# Patient Record
Sex: FEMALE | Race: WHITE | NOT HISPANIC OR LATINO | Employment: FULL TIME | URBAN - METROPOLITAN AREA
[De-identification: names, ages, dates, MRNs, and addresses within clinical notes are randomized per-mention and may not be internally consistent; named-entity substitution may affect disease eponyms.]

---

## 2017-02-28 ENCOUNTER — APPOINTMENT (OUTPATIENT)
Dept: WOMENS IMAGING | Facility: HOSPITAL | Age: 42
End: 2017-02-28

## 2017-02-28 PROCEDURE — 77067 SCR MAMMO BI INCL CAD: CPT | Performed by: RADIOLOGY

## 2018-03-06 ENCOUNTER — APPOINTMENT (OUTPATIENT)
Dept: WOMENS IMAGING | Facility: HOSPITAL | Age: 43
End: 2018-03-06

## 2018-03-06 PROCEDURE — 77067 SCR MAMMO BI INCL CAD: CPT | Performed by: RADIOLOGY

## 2018-04-17 ENCOUNTER — HOSPITAL ENCOUNTER (EMERGENCY)
Facility: HOSPITAL | Age: 43
Discharge: HOME | End: 2018-04-17
Attending: EMERGENCY MEDICINE | Admitting: EMERGENCY MEDICINE

## 2018-04-17 ENCOUNTER — APPOINTMENT (OUTPATIENT)
Dept: GENERAL RADIOLOGY | Facility: HOSPITAL | Age: 43
End: 2018-04-17

## 2018-04-17 VITALS
TEMPERATURE: 98.3 F | OXYGEN SATURATION: 98 % | HEART RATE: 68 BPM | BODY MASS INDEX: 32.8 KG/M2 | RESPIRATION RATE: 16 BRPM | HEIGHT: 67 IN | WEIGHT: 209 LBS | DIASTOLIC BLOOD PRESSURE: 72 MMHG | SYSTOLIC BLOOD PRESSURE: 108 MMHG

## 2018-04-17 DIAGNOSIS — R05.9 COUGH: Primary | ICD-10-CM

## 2018-04-17 LAB
ALBUMIN SERPL-MCNC: 4.5 G/DL (ref 3.5–5.2)
ALBUMIN/GLOB SERPL: 1.7 G/DL
ALP SERPL-CCNC: 46 U/L (ref 39–117)
ALT SERPL W P-5'-P-CCNC: 24 U/L (ref 1–33)
ANION GAP SERPL CALCULATED.3IONS-SCNC: 10.7 MMOL/L
AST SERPL-CCNC: 15 U/L (ref 1–32)
BASOPHILS # BLD AUTO: 0.03 10*3/MM3 (ref 0–0.2)
BASOPHILS NFR BLD AUTO: 0.4 % (ref 0–1.5)
BILIRUB SERPL-MCNC: 0.6 MG/DL (ref 0.1–1.2)
BUN BLD-MCNC: 11 MG/DL (ref 6–20)
BUN/CREAT SERPL: 12.6 (ref 7–25)
CALCIUM SPEC-SCNC: 9.4 MG/DL (ref 8.6–10.5)
CHLORIDE SERPL-SCNC: 102 MMOL/L (ref 98–107)
CO2 SERPL-SCNC: 28.3 MMOL/L (ref 22–29)
CREAT BLD-MCNC: 0.87 MG/DL (ref 0.57–1)
D DIMER PPP FEU-MCNC: 0.45 MCGFEU/ML (ref 0–0.49)
DEPRECATED RDW RBC AUTO: 43.6 FL (ref 37–54)
EOSINOPHIL # BLD AUTO: 0.3 10*3/MM3 (ref 0–0.7)
EOSINOPHIL NFR BLD AUTO: 3.6 % (ref 0.3–6.2)
ERYTHROCYTE [DISTWIDTH] IN BLOOD BY AUTOMATED COUNT: 13.3 % (ref 11.7–13)
GFR SERPL CREATININE-BSD FRML MDRD: 71 ML/MIN/1.73
GLOBULIN UR ELPH-MCNC: 2.7 GM/DL
GLUCOSE BLD-MCNC: 91 MG/DL (ref 65–99)
HCG SERPL QL: NEGATIVE
HCT VFR BLD AUTO: 43.3 % (ref 35.6–45.5)
HGB BLD-MCNC: 14.6 G/DL (ref 11.9–15.5)
HOLD SPECIMEN: NORMAL
IMM GRANULOCYTES # BLD: 0 10*3/MM3 (ref 0–0.03)
IMM GRANULOCYTES NFR BLD: 0 % (ref 0–0.5)
LYMPHOCYTES # BLD AUTO: 2.26 10*3/MM3 (ref 0.9–4.8)
LYMPHOCYTES NFR BLD AUTO: 27.3 % (ref 19.6–45.3)
MCH RBC QN AUTO: 30.7 PG (ref 26.9–32)
MCHC RBC AUTO-ENTMCNC: 33.7 G/DL (ref 32.4–36.3)
MCV RBC AUTO: 91 FL (ref 80.5–98.2)
MONOCYTES # BLD AUTO: 0.46 10*3/MM3 (ref 0.2–1.2)
MONOCYTES NFR BLD AUTO: 5.6 % (ref 5–12)
NEUTROPHILS # BLD AUTO: 5.23 10*3/MM3 (ref 1.9–8.1)
NEUTROPHILS NFR BLD AUTO: 63.1 % (ref 42.7–76)
NT-PROBNP SERPL-MCNC: 38 PG/ML (ref 0–450)
PLATELET # BLD AUTO: 256 10*3/MM3 (ref 140–500)
PMV BLD AUTO: 8.9 FL (ref 6–12)
POTASSIUM BLD-SCNC: 4.1 MMOL/L (ref 3.5–5.2)
PROT SERPL-MCNC: 7.2 G/DL (ref 6–8.5)
RBC # BLD AUTO: 4.76 10*6/MM3 (ref 3.9–5.2)
SODIUM BLD-SCNC: 141 MMOL/L (ref 136–145)
TROPONIN T SERPL-MCNC: <0.01 NG/ML (ref 0–0.03)
WBC NRBC COR # BLD: 8.28 10*3/MM3 (ref 4.5–10.7)
WHOLE BLOOD HOLD SPECIMEN: NORMAL
WHOLE BLOOD HOLD SPECIMEN: NORMAL

## 2018-04-17 PROCEDURE — 93005 ELECTROCARDIOGRAM TRACING: CPT

## 2018-04-17 PROCEDURE — 99284 EMERGENCY DEPT VISIT MOD MDM: CPT

## 2018-04-17 PROCEDURE — 84484 ASSAY OF TROPONIN QUANT: CPT

## 2018-04-17 PROCEDURE — 84703 CHORIONIC GONADOTROPIN ASSAY: CPT

## 2018-04-17 PROCEDURE — 80053 COMPREHEN METABOLIC PANEL: CPT

## 2018-04-17 PROCEDURE — 36415 COLL VENOUS BLD VENIPUNCTURE: CPT

## 2018-04-17 PROCEDURE — 93005 ELECTROCARDIOGRAM TRACING: CPT | Performed by: EMERGENCY MEDICINE

## 2018-04-17 PROCEDURE — 85025 COMPLETE CBC W/AUTO DIFF WBC: CPT

## 2018-04-17 PROCEDURE — 83880 ASSAY OF NATRIURETIC PEPTIDE: CPT

## 2018-04-17 PROCEDURE — 93010 ELECTROCARDIOGRAM REPORT: CPT | Performed by: INTERNAL MEDICINE

## 2018-04-17 PROCEDURE — 85379 FIBRIN DEGRADATION QUANT: CPT | Performed by: NURSE PRACTITIONER

## 2018-04-17 RX ORDER — METHYLPREDNISOLONE 4 MG/1
TABLET ORAL
Qty: 21 TABLET | Refills: 0 | Status: SHIPPED | OUTPATIENT
Start: 2018-04-17 | End: 2018-08-27

## 2018-04-17 RX ORDER — AZITHROMYCIN 250 MG/1
250 TABLET, FILM COATED ORAL DAILY
Qty: 6 TABLET | Refills: 0 | Status: SHIPPED | OUTPATIENT
Start: 2018-04-17 | End: 2018-08-27

## 2018-04-17 RX ORDER — SODIUM CHLORIDE 0.9 % (FLUSH) 0.9 %
10 SYRINGE (ML) INJECTION AS NEEDED
Status: DISCONTINUED | OUTPATIENT
Start: 2018-04-17 | End: 2018-04-17 | Stop reason: HOSPADM

## 2018-04-17 RX ORDER — ALBUTEROL SULFATE 90 UG/1
2 AEROSOL, METERED RESPIRATORY (INHALATION) EVERY 4 HOURS PRN
Qty: 1 INHALER | Refills: 0 | Status: SHIPPED | OUTPATIENT
Start: 2018-04-17 | End: 2018-08-27

## 2018-04-17 NOTE — ED NOTES
"Pt c/o cough x's 2 weeks and SOA with exertion since Friday. Pt was seen at an urgent care today and sent to ED for further evaluation. Pt had a \"normal\" chest xray today at the urgent care per Pt. Pt has a history of Factor V Leiden clotting disorder.     Faina Hanna RN  04/17/18 2057    "

## 2018-04-17 NOTE — ED TRIAGE NOTES
Pt was seen at Urgent Care for cough and allergies today, pt states she had negative chest xray and abnormal EKG, sent to ED for further eval.  Pt reports being sick x2 weeks, reports mild SOA and burning chest pain. States symptoms worse since Friday

## 2018-04-17 NOTE — ED PROVIDER NOTES
EMERGENCY DEPARTMENT ENCOUNTER    CHIEF COMPLAINT  Chief Complaint: Non-productive cough   History given by:Pt  History limited by:Nothing  Room Number: 19/19  PMD: Otilio Bernstein MD      HPI:  Pt is a 42 y.o. female who presents with a sinus HA and non-productive cough for the last two weeks. Pt reports that [facility] where she had an unremarkable CXR but was told she had an abnormal EKG and sent to the ER for further evaluation out of concern for PE. Pt also c/o SOA and CP with deep breath. Pt denies N/V/D, back pain, or recent travel. She reports that she has had Tylenol, pseudoephedrine and ibuprofen without relief.     Duration: two weeks  Timing:constant  Quality:SOA and non-productive cough  Intensity/Severity:mild to moderate  Progression:unchanged  Associated Symptoms:CP with inspiration, sinus HA, SOA  Aggravating Factors:none  Alleviating Factors:none  Previous Episodes:None stated  Treatment before arrival:Pt reports she was seen [facility] today and informed that her EKG was unremarkable.     PAST MEDICAL HISTORY  Active Ambulatory Problems     Diagnosis Date Noted   • Apnea, sleep 09/21/2016     Resolved Ambulatory Problems     Diagnosis Date Noted   • No Resolved Ambulatory Problems     Past Medical History:   Diagnosis Date   • DVT (deep venous thrombosis)    • Factor 5 Leiden mutation, heterozygous        PAST SURGICAL HISTORY  Past Surgical History:   Procedure Laterality Date   • BREAST SURGERY Bilateral 2009    breast reduction   • COLONOSCOPY N/A 7/25/2016    Procedure: COLONOSCOPY to cecum / TI;  Surgeon: Ulisses Yousif MD;  Location: [facility] ENDOSCOPY;  Service:    • COLONOSCOPY W/ POLYPECTOMY  2011   • ENDOMETRIAL ABLATION  2011   • ENDOSCOPY N/A 7/25/2016    Procedure: ESOPHAGOGASTRODUODENOSCOPY;  Surgeon: Ulisses Yousif MD;  Location: [facility] ENDOSCOPY;  Service:    • LAPAROSCOPIC TUBAL LIGATION  2011   • TONSILLECTOMY         FAMILY HISTORY  Family History   Problem Relation Age  of Onset   • Breast cancer Maternal Aunt    • Uterine cancer Maternal Aunt    • Pancreatic cancer Maternal Uncle    • Colon cancer Paternal Uncle    • Thyroid cancer Maternal Grandmother    • Lung cancer Maternal Grandfather        SOCIAL HISTORY  Social History     Social History   • Marital status:      Spouse name: N/A   • Number of children: N/A   • Years of education: N/A     Occupational History   • Not on file.     Social History Main Topics   • Smoking status: Never Smoker   • Smokeless tobacco: Not on file   • Alcohol use Yes      Comment: occasionally   • Drug use: No   • Sexual activity: Not on file      Comment: uterine ablation, no periods since 2012     Other Topics Concern   • Not on file     Social History Narrative   • No narrative on file         ALLERGIES  Review of patient's allergies indicates no known allergies.    REVIEW OF SYSTEMS  Review of Systems   Constitutional: Negative.  Negative for activity change, appetite change ( decreased), chills, fatigue and fever.   HENT: Negative for congestion, ear pain, rhinorrhea and sore throat.    Eyes: Negative.    Respiratory: Positive for cough (dry) and shortness of breath.    Cardiovascular: Positive for chest pain (with inspiration). Negative for palpitations and leg swelling ( pedal).   Gastrointestinal: Negative.  Negative for abdominal pain, constipation, diarrhea, nausea and vomiting.   Endocrine: Negative.    Genitourinary: Negative.  Negative for decreased urine volume, difficulty urinating, dysuria, menstrual problem, pelvic pain, urgency and vaginal discharge.   Musculoskeletal: Negative.  Negative for back pain.   Skin: Negative.  Negative for rash.   Allergic/Immunologic: Negative.    Neurological: Positive for headaches (sinus). Negative for dizziness, weakness, light-headedness and numbness.   Hematological: Negative.    Psychiatric/Behavioral: Negative.  The patient is not nervous/anxious.    All other systems reviewed and are  negative.      PHYSICAL EXAM  ED Triage Vitals   Temp Heart Rate Resp BP SpO2   04/17/18 1123 04/17/18 1123 04/17/18 1138 04/17/18 1138 04/17/18 1123   97.9 °F (36.6 °C) 62 18 136/87 100 %      Temp src Heart Rate Source Patient Position BP Location FiO2 (%)   04/17/18 1123 04/17/18 1123 04/17/18 1424 04/17/18 1424 --   Tympanic Monitor Sitting Right arm        Physical Exam   Constitutional: She is well-developed, well-nourished, and in no distress. No distress.   HENT:   Head: Normocephalic and atraumatic.   Mouth/Throat: Oropharynx is clear and moist and mucous membranes are normal.   Eyes: Pupils are equal, round, and reactive to light.   Neck: Normal range of motion.   Cardiovascular: Normal rate, regular rhythm and normal heart sounds.    Pulmonary/Chest: Effort normal and breath sounds normal. She has no wheezes.   Abdominal: Soft. Bowel sounds are normal. There is no tenderness.   Musculoskeletal: Normal range of motion. She exhibits no edema or tenderness (calf).   Neurological: She is alert.   Skin: Skin is warm and dry. No rash noted.   Psychiatric: Mood, memory, affect and judgment normal.   Nursing note and vitals reviewed.      LAB RESULTS  Recent Results (from the past 24 hour(s))   Comprehensive Metabolic Panel    Collection Time: 04/17/18 11:50 AM   Result Value Ref Range    Glucose 91 65 - 99 mg/dL    BUN 11 6 - 20 mg/dL    Creatinine 0.87 0.57 - 1.00 mg/dL    Sodium 141 136 - 145 mmol/L    Potassium 4.1 3.5 - 5.2 mmol/L    Chloride 102 98 - 107 mmol/L    CO2 28.3 22.0 - 29.0 mmol/L    Calcium 9.4 8.6 - 10.5 mg/dL    Total Protein 7.2 6.0 - 8.5 g/dL    Albumin 4.50 3.50 - 5.20 g/dL    ALT (SGPT) 24 1 - 33 U/L    AST (SGOT) 15 1 - 32 U/L    Alkaline Phosphatase 46 39 - 117 U/L    Total Bilirubin 0.6 0.1 - 1.2 mg/dL    eGFR Non African Amer 71 >60 mL/min/1.73    Globulin 2.7 gm/dL    A/G Ratio 1.7 g/dL    BUN/Creatinine Ratio 12.6 7.0 - 25.0    Anion Gap 10.7 mmol/L   BNP    Collection Time: 04/17/18  11:50 AM   Result Value Ref Range    proBNP 38.0 0.0 - 450.0 pg/mL   Troponin    Collection Time: 04/17/18 11:50 AM   Result Value Ref Range    Troponin T <0.010 0.000 - 0.030 ng/mL   hCG, Serum, Qualitative    Collection Time: 04/17/18 11:50 AM   Result Value Ref Range    HCG Qualitative Negative Indeterminate, Negative   Light Blue Top    Collection Time: 04/17/18 11:50 AM   Result Value Ref Range    Extra Tube hold for add-on    Green Top (Gel)    Collection Time: 04/17/18 11:50 AM   Result Value Ref Range    Extra Tube Hold for add-ons.    Lavender Top    Collection Time: 04/17/18 11:50 AM   Result Value Ref Range    Extra Tube hold for add-on    CBC Auto Differential    Collection Time: 04/17/18 11:50 AM   Result Value Ref Range    WBC 8.28 4.50 - 10.70 10*3/mm3    RBC 4.76 3.90 - 5.20 10*6/mm3    Hemoglobin 14.6 11.9 - 15.5 g/dL    Hematocrit 43.3 35.6 - 45.5 %    MCV 91.0 80.5 - 98.2 fL    MCH 30.7 26.9 - 32.0 pg    MCHC 33.7 32.4 - 36.3 g/dL    RDW 13.3 (H) 11.7 - 13.0 %    RDW-SD 43.6 37.0 - 54.0 fl    MPV 8.9 6.0 - 12.0 fL    Platelets 256 140 - 500 10*3/mm3    Neutrophil % 63.1 42.7 - 76.0 %    Lymphocyte % 27.3 19.6 - 45.3 %    Monocyte % 5.6 5.0 - 12.0 %    Eosinophil % 3.6 0.3 - 6.2 %    Basophil % 0.4 0.0 - 1.5 %    Immature Grans % 0.0 0.0 - 0.5 %    Neutrophils, Absolute 5.23 1.90 - 8.10 10*3/mm3    Lymphocytes, Absolute 2.26 0.90 - 4.80 10*3/mm3    Monocytes, Absolute 0.46 0.20 - 1.20 10*3/mm3    Eosinophils, Absolute 0.30 0.00 - 0.70 10*3/mm3    Basophils, Absolute 0.03 0.00 - 0.20 10*3/mm3    Immature Grans, Absolute 0.00 0.00 - 0.03 10*3/mm3   D-dimer, Quantitative    Collection Time: 04/17/18 11:50 AM   Result Value Ref Range    D-Dimer, Quantitative 0.45 0.00 - 0.49 MCGFEU/mL       I ordered the above labs and reviewed the results    EKG    ekg was interpreted by Dr. Gonzales      PROGRESS AND CONSULTS    1444 - Informed pt of the result of her EKG and lab work which were unremarkable. D/w pt  "the plan to collect a D-dimer test prior to plans for disposition.     1528 - Rechecked pt. Pt is resting comfortably in NAD. Informed pt of the result of her negative D-dimer test and the plans to discharge home with a follow up with PCP. Pt understands and agrees with plan. All questions answered.     1534 - Reviewed pt's history and workup with Dr. Gonzales.  After a bedside evaluation; Dr Gonzales agrees with the plan of care    COURSE & MEDICAL DECISION MAKING  Pertinent Labs and Imaging studies that were ordered and reviewed are noted above.  Results were reviewed/discussed with the patient and they were also made aware of online assess.   Pt also made aware that some labs, such as cultures, will not be resulted during ER visit and follow up with PMD is necessary.     MEDICATIONS GIVEN IN ER  Medications   sodium chloride 0.9 % flush 10 mL (not administered)       /76   Pulse 70   Temp 99 °F (37.2 °C) (Oral)   Resp 18   Ht 170.2 cm (67\")   Wt 94.8 kg (209 lb)   SpO2 99%   BMI 32.73 kg/m²     Discussed all results and noted any abnormalities with patient.  Discussed absoute need to recheck abnormalities with PCP    Reviewed implications of results, diagnosis, meds, responsibility to follow up, warning signs and symptoms of possible worsening, potential complications and reasons to return to ER with patient    Discussed plan for discharge, as there is no emergent indication for admission.  Pt is agreeable and understands need for follow up and repeat testing.  Pt is aware that discharge does not mean that nothing is wrong but it indicates no emergency is present and they must continue care with PCP.  Pt is discharged with instructions to follow up with primary care doctor to have their blood pressure rechecked.       DIAGNOSIS  Final diagnoses:   Cough       FOLLOW UP   Otilio Bernstein MD    Schedule an appointment as soon as possible for a visit         RX     Medication List      New Prescriptions  "   albuterol 108 (90 Base) MCG/ACT inhaler  Commonly known as:  PROVENTIL HFA;VENTOLIN HFA  Inhale 2 puffs Every 4 (Four) Hours As Needed for Wheezing or Shortness of   Air.     azithromycin 250 MG tablet  Commonly known as:  ZITHROMAX  Take 1 tablet by mouth Daily. Take 2 tablets the first day, then 1 tablet   daily for 4 days.     MethylPREDNISolone 4 MG tablet  Commonly known as:  MEDROL (ROLANDA)  Take as directed on package instructions.        Stop    aspirin 325 MG tablet          Risks, benefits, alternatives discussed with patient.  Pt consents to treatment and agrees to follow up with PMD tomorrow for further care and any other prescriptions.     I personally reviewed the past medical history, past surgical history, social history, family history, current medications and allergies as they appear in this chart.  The scribe's note accurately reflects the work and decisions made by me.     Documentation assistance provided by jameel Aparicio for SARAH BETH Yeh.  Information recorded by the scribe was done at my direction and has been verified and validated by me.       Zaki Aparicio  04/17/18 5089       SARAH BETH Pineda  04/17/18 9008

## 2018-04-17 NOTE — DISCHARGE INSTRUCTIONS
Pt instructions:  Rest  Follow up with Primary Care Doctor for further management and to have your blood pressure rechecked.   Return to ER with pain, swelling, numbness/tingling, fever, chills, weakness, nausea, vomiting, diarrhea, abdominal pain, back pain, urinary concerns, chest pain, shortness of breath, dizziness, headache, worsening of symptoms or other concerns.

## 2018-04-17 NOTE — ED TRIAGE NOTES
PT C/O SOA, COUGH, HEADACHE. PT SEEN AT [facility] AND SENT TO ER FOR ABNORMAL EKG.

## 2018-04-17 NOTE — ED PROVIDER NOTES
MD ATTESTATION NOTE    The ATNHONY and I have discussed this patient's history, physical exam, and treatment plan.  I have reviewed the documentation and personally had a face to face interaction with the patient. I affirm the documentation and agree with the treatment and plan.  The attached note describes my personal findings.    Pt with nonproductive cough x2 weeks that became worse 3 days ago. Pt has a hx of seasonal allergies and believed this to be the case. She went to [facility] today and had CXR done. Pt had an EKG done and was referred to the ED. Pt has a hx of DVT and Factor V Leiden.     On exam, she is A&Ox3, NAD, and PERRLA. Heart is RRR without murmur, rubs, or gallops. Lungs are CTAB.    Reviewed the EKG done at the [facility] which showed Sinus Bradycardia, 55BPM and incomplete RBBB. She had a negative D-dimer in the ED. CTA not indicated at this time. Will discharge with Z-pack, medrol and albuterol.    EKG    EKG time: 1127  Rhythm/Rate: NSR, 63BPM  Incomplete RBBB  No Acute Ischemia  Non-Specific ST-T changes    unchanged compared to prior today at [facility]    Interpreted Contemporaneously by me.  Independently viewed by me        Documentation assistance provided by jameel Yarbrough for Dr. Gonzales.  Information recorded by the scribe was done at my direction and has been verified and validated by me.       Kin Yarbrough  04/17/18 1555       Kin Yarbrough  04/17/18 1559       Kuldeep Gonzales MD  04/17/18 [id number]

## 2018-08-27 ENCOUNTER — OFFICE VISIT (OUTPATIENT)
Dept: INTERNAL MEDICINE | Facility: CLINIC | Age: 43
End: 2018-08-27

## 2018-08-27 VITALS
HEIGHT: 67 IN | WEIGHT: 206 LBS | OXYGEN SATURATION: 98 % | DIASTOLIC BLOOD PRESSURE: 72 MMHG | SYSTOLIC BLOOD PRESSURE: 100 MMHG | BODY MASS INDEX: 32.33 KG/M2 | HEART RATE: 72 BPM

## 2018-08-27 DIAGNOSIS — Z00.00 PE (PHYSICAL EXAM), ANNUAL: Primary | ICD-10-CM

## 2018-08-27 DIAGNOSIS — Z23 NEED FOR TDAP VACCINATION: ICD-10-CM

## 2018-08-27 DIAGNOSIS — R05.3 PERSISTENT COUGH: ICD-10-CM

## 2018-08-27 LAB
ALBUMIN SERPL-MCNC: 4.3 G/DL (ref 3.5–5.2)
ALBUMIN/GLOB SERPL: 1.4 G/DL
ALP SERPL-CCNC: 48 U/L (ref 39–117)
ALT SERPL W P-5'-P-CCNC: 19 U/L (ref 1–33)
ANION GAP SERPL CALCULATED.3IONS-SCNC: 11.3 MMOL/L
AST SERPL-CCNC: 18 U/L (ref 1–32)
BACTERIA UR QL AUTO: ABNORMAL /HPF
BASOPHILS # BLD AUTO: 0.02 10*3/MM3 (ref 0–0.2)
BASOPHILS NFR BLD AUTO: 0.3 % (ref 0–2)
BILIRUB SERPL-MCNC: 0.5 MG/DL (ref 0.1–1.2)
BILIRUB UR QL STRIP: NEGATIVE
BUN BLD-MCNC: 9 MG/DL (ref 6–20)
BUN/CREAT SERPL: 11.5 (ref 7–25)
CALCIUM SPEC-SCNC: 9.7 MG/DL (ref 8.6–10.5)
CHLORIDE SERPL-SCNC: 105 MMOL/L (ref 98–107)
CHOLEST SERPL-MCNC: 180 MG/DL (ref 0–200)
CLARITY UR: ABNORMAL
CO2 SERPL-SCNC: 23.7 MMOL/L (ref 22–29)
COLOR UR: YELLOW
CREAT BLD-MCNC: 0.78 MG/DL (ref 0.57–1)
DEPRECATED RDW RBC AUTO: 42.6 FL (ref 37–54)
EOSINOPHIL # BLD AUTO: 0.16 10*3/MM3 (ref 0–0.7)
EOSINOPHIL NFR BLD AUTO: 2.4 % (ref 0–5)
ERYTHROCYTE [DISTWIDTH] IN BLOOD BY AUTOMATED COUNT: 13.3 % (ref 11.5–15)
GFR SERPL CREATININE-BSD FRML MDRD: 80 ML/MIN/1.73
GLOBULIN UR ELPH-MCNC: 3 GM/DL
GLUCOSE BLD-MCNC: 99 MG/DL (ref 65–99)
GLUCOSE UR STRIP-MCNC: NEGATIVE MG/DL
HCT VFR BLD AUTO: 39.9 % (ref 34.1–44.9)
HDLC SERPL-MCNC: 53 MG/DL (ref 40–60)
HGB BLD-MCNC: 14 G/DL (ref 11.2–15.7)
HGB UR QL STRIP.AUTO: NEGATIVE
HYALINE CASTS UR QL AUTO: ABNORMAL /LPF
KETONES UR QL STRIP: NEGATIVE
LDLC SERPL CALC-MCNC: 114 MG/DL (ref 0–100)
LDLC/HDLC SERPL: 2.15 {RATIO}
LEUKOCYTE ESTERASE UR QL STRIP.AUTO: ABNORMAL
LYMPHOCYTES # BLD AUTO: 1.74 10*3/MM3 (ref 0.8–7)
LYMPHOCYTES NFR BLD AUTO: 26.2 % (ref 10–60)
MCH RBC QN AUTO: 31.3 PG (ref 26–34)
MCHC RBC AUTO-ENTMCNC: 35.1 G/DL (ref 31–37)
MCV RBC AUTO: 89.3 FL (ref 80–100)
MONOCYTES # BLD AUTO: 0.47 10*3/MM3 (ref 0–1)
MONOCYTES NFR BLD AUTO: 7.1 % (ref 0–13)
MUCOUS THREADS URNS QL MICRO: ABNORMAL /HPF
NEUTROPHILS # BLD AUTO: 4.25 10*3/MM3 (ref 1–11)
NEUTROPHILS NFR BLD AUTO: 64 % (ref 30–85)
NITRITE UR QL STRIP: NEGATIVE
PH UR STRIP.AUTO: <=5 [PH] (ref 5–8)
PLATELET # BLD AUTO: 246 10*3/MM3 (ref 150–450)
PMV BLD AUTO: 9.9 FL (ref 6–12)
POTASSIUM BLD-SCNC: 4.1 MMOL/L (ref 3.5–5.2)
PROT SERPL-MCNC: 7.3 G/DL (ref 6–8.5)
PROT UR QL STRIP: NEGATIVE
RBC # BLD AUTO: 4.47 10*6/MM3 (ref 3.93–5.22)
RBC # UR: ABNORMAL /HPF
REF LAB TEST METHOD: ABNORMAL
SODIUM BLD-SCNC: 140 MMOL/L (ref 136–145)
SP GR UR STRIP: >=1.03 (ref 1–1.03)
SQUAMOUS #/AREA URNS HPF: ABNORMAL /HPF
TRIGL SERPL-MCNC: 66 MG/DL (ref 0–150)
TSH SERPL-ACNC: 1.03 MIU/ML (ref 0.27–4.2)
UROBILINOGEN UR QL STRIP: ABNORMAL
VLDLC SERPL-MCNC: 13.2 MG/DL (ref 5–40)
WBC NRBC COR # BLD: 6.64 10*3/MM3 (ref 5–10)
WBC UR QL AUTO: ABNORMAL /HPF

## 2018-08-27 PROCEDURE — 85025 COMPLETE CBC W/AUTO DIFF WBC: CPT | Performed by: NURSE PRACTITIONER

## 2018-08-27 PROCEDURE — 80061 LIPID PANEL: CPT | Performed by: NURSE PRACTITIONER

## 2018-08-27 PROCEDURE — 80053 COMPREHEN METABOLIC PANEL: CPT | Performed by: NURSE PRACTITIONER

## 2018-08-27 PROCEDURE — 90715 TDAP VACCINE 7 YRS/> IM: CPT | Performed by: NURSE PRACTITIONER

## 2018-08-27 PROCEDURE — 81001 URINALYSIS AUTO W/SCOPE: CPT | Performed by: NURSE PRACTITIONER

## 2018-08-27 PROCEDURE — 99396 PREV VISIT EST AGE 40-64: CPT | Performed by: NURSE PRACTITIONER

## 2018-08-27 PROCEDURE — 90471 IMMUNIZATION ADMIN: CPT | Performed by: NURSE PRACTITIONER

## 2018-08-27 PROCEDURE — 93000 ELECTROCARDIOGRAM COMPLETE: CPT | Performed by: NURSE PRACTITIONER

## 2018-08-27 RX ORDER — OMEPRAZOLE 20 MG/1
20 TABLET, DELAYED RELEASE ORAL DAILY
Qty: 30 TABLET | Refills: 3 | Status: SHIPPED | OUTPATIENT
Start: 2018-08-27 | End: 2021-08-11

## 2018-08-27 RX ORDER — MONTELUKAST SODIUM 10 MG/1
10 TABLET ORAL NIGHTLY
Qty: 30 TABLET | Refills: 3 | Status: SHIPPED | OUTPATIENT
Start: 2018-08-27 | End: 2019-06-21

## 2018-08-27 NOTE — PROGRESS NOTES
Subjective   Jocelyn Reyes is a 42 y.o. female who presents for a physical exam.    She c/o a recurrent, nonproductive cough since her URI in April. Denies dyspnea. She has a history of asthma as a child but has not had difficulty with symptoms (no current meds).         The following portions of the patient's history were reviewed and updated as appropriate: allergies, current medications, past social history and problem list.    Past Medical History:   Diagnosis Date   • DVT (deep venous thrombosis) (CMS/[facility])     2010   • Factor 5 Leiden mutation, heterozygous (CMS/[facility])          Current Outpatient Prescriptions:   •  fexofenadine (ALLEGRA) 180 MG tablet, Take 180 mg by mouth as needed., Disp: , Rfl:   •  IBUPROFEN PO, Take  by mouth., Disp: , Rfl:   •  Pseudoephedrine HCl (SUDAFED 24 HOUR PO), Take  by mouth., Disp: , Rfl:   •  montelukast (SINGULAIR) 10 MG tablet, Take 1 tablet by mouth Every Night., Disp: 30 tablet, Rfl: 3  •  omeprazole OTC (PriLOSEC OTC) 20 MG EC tablet, Take 1 tablet by mouth Daily., Disp: 30 tablet, Rfl: 3    No Known Allergies    Review of Systems   Constitutional: Positive for fatigue (chronic, worse over past year). Negative for activity change, appetite change, chills, diaphoresis, fever and unexpected weight change.   HENT: Positive for congestion and postnasal drip. Negative for dental problem, drooling, ear discharge, ear pain, facial swelling, hearing loss, mouth sores, nosebleeds, rhinorrhea, sinus pressure, sore throat, tinnitus and trouble swallowing.    Eyes: Negative for photophobia, pain, discharge, redness, itching and visual disturbance.   Respiratory: Positive for cough. Negative for apnea, choking, chest tightness, shortness of breath and wheezing.    Cardiovascular: Positive for palpitations (recurrent, hx echo in past-no changes). Negative for chest pain and leg swelling.        No orthopnea, PND, LARIOS   Gastrointestinal: Negative for abdominal pain, blood in stool,  "constipation, diarrhea, nausea and vomiting.   Endocrine: Positive for cold intolerance. Negative for heat intolerance, polydipsia and polyuria.   Genitourinary: Negative for decreased urine volume, dysuria, enuresis, flank pain, frequency, hematuria and urgency.   Musculoskeletal: Positive for arthralgias and back pain. Negative for gait problem, joint swelling, myalgias, neck pain and neck stiffness.   Skin: Negative for color change and rash.        No hair changes, no nail changes   Allergic/Immunologic: Negative for environmental allergies, food allergies and immunocompromised state.   Neurological: Positive for dizziness, light-headedness, numbness (intermittent numbness of right 5th toe) and headaches (reports recurrent sinus headaches). Negative for tremors, seizures, syncope, speech difficulty and weakness.   Hematological: Negative for adenopathy. Bruises/bleeds easily.   Psychiatric/Behavioral: Negative for agitation, confusion, decreased concentration, dysphoric mood, sleep disturbance and suicidal ideas. The patient is nervous/anxious.        Objective   Vitals:    08/27/18 0805   BP: 100/72   BP Location: Left arm   Patient Position: Sitting   Cuff Size: Adult   Pulse: 72   SpO2: 98%   Weight: 93.4 kg (206 lb)   Height: 170.2 cm (67\")     Physical Exam   Constitutional: She is oriented to person, place, and time. She appears well-developed and well-nourished. No distress.   HENT:   Right Ear: Hearing, tympanic membrane, external ear and ear canal normal.   Left Ear: Hearing, tympanic membrane, external ear and ear canal normal.   Nose: Right sinus exhibits no maxillary sinus tenderness and no frontal sinus tenderness. Left sinus exhibits no maxillary sinus tenderness and no frontal sinus tenderness.   Mouth/Throat: Posterior oropharyngeal erythema present.   Eyes: Pupils are equal, round, and reactive to light. Conjunctivae, EOM and lids are normal.   Neck: Trachea normal and phonation normal. Neck " supple. Normal carotid pulses and no JVD present. Carotid bruit is not present. No thyroid mass and no thyromegaly present.   Cardiovascular: Normal rate, regular rhythm, S1 normal and S2 normal.  PMI is not displaced.  Exam reveals no gallop and no friction rub.    No murmur heard.  Pulses:       Carotid pulses are 2+ on the right side, and 2+ on the left side.       Radial pulses are 2+ on the right side, and 2+ on the left side.        Dorsalis pedis pulses are 2+ on the right side, and 2+ on the left side.   Pulmonary/Chest: Effort normal and breath sounds normal. She has no wheezes. She has no rhonchi. She has no rales. Chest wall is not dull to percussion.   Abdominal: Soft. Normal appearance, normal aorta and bowel sounds are normal. She exhibits no abdominal bruit and no mass. There is no hepatosplenomegaly. There is no tenderness.   Musculoskeletal: Normal range of motion. She exhibits no edema or tenderness.   Lymphadenopathy:     She has no cervical adenopathy.        Right: No supraclavicular adenopathy present.        Left: No supraclavicular adenopathy present.   Neurological: She is alert and oriented to person, place, and time. She has normal strength and normal reflexes. No cranial nerve deficit or sensory deficit. Coordination normal.   Skin: Skin is warm and dry. No rash noted.   Psychiatric: She has a normal mood and affect. Her speech is normal and behavior is normal. Judgment and thought content normal. Cognition and memory are normal. She is attentive.   Nursing note and vitals reviewed.      Assessment/Plan   Jocelyn was seen today for annual exam.    Diagnoses and all orders for this visit:    PE (physical exam), annual  -     CBC Auto Differential; Future  -     Comprehensive Metabolic Panel; Future  -     Lipid Panel; Future  -     TSH; Future  -     Urinalysis With Microscopic If Indicated (No Culture) - Urine, Clean Catch; Future  -     ECG 12 Lead  -     CBC Auto Differential  -      Comprehensive Metabolic Panel  -     Lipid Panel  -     TSH  -     Urinalysis With Microscopic If Indicated (No Culture) - Urine, Clean Catch  -     Urinalysis, Microscopic Only - Urine, Clean Catch; Future  -     Urinalysis, Microscopic Only - Urine, Clean Catch    Persistent cough  Comments:  due to postnasal drainage vs. reflux, start Omeprazole & Singulair and re-evaluate in 6-8 weeks  Orders:  -     omeprazole OTC (PriLOSEC OTC) 20 MG EC tablet; Take 1 tablet by mouth Daily.  -     montelukast (SINGULAIR) 10 MG tablet; Take 1 tablet by mouth Every Night.    Need for Tdap vaccination  -     Tdap Vaccine Greater Than or Equal To 8yo IM      ECG 12 Lead  Date/Time: 8/27/2018 8:50 AM  Performed by: ELLEN LOPEZ  Authorized by: JOAN SANTOS   Comparison: compared with previous ECG from 4/17/2018  Similar to previous ECG  Rhythm: sinus rhythm  Rate: normal  BPM: 60  Conduction: conduction normal  ST Segments: ST segments normal  T Waves: T waves normal  QRS axis: normal  Clinical impression: normal ECG        She is doing well, has not been as physically activity due to busy lifestyle and caring for   (undergoing workup for syncopal episodes). Check fasting labs today.

## 2018-11-19 ENCOUNTER — APPOINTMENT (OUTPATIENT)
Dept: WOMENS IMAGING | Facility: HOSPITAL | Age: 43
End: 2018-11-19

## 2018-11-19 ENCOUNTER — OFFICE VISIT (OUTPATIENT)
Dept: INTERNAL MEDICINE | Facility: CLINIC | Age: 43
End: 2018-11-19

## 2018-11-19 VITALS
BODY MASS INDEX: 31.71 KG/M2 | WEIGHT: 202 LBS | HEIGHT: 67 IN | OXYGEN SATURATION: 98 % | DIASTOLIC BLOOD PRESSURE: 78 MMHG | SYSTOLIC BLOOD PRESSURE: 122 MMHG | HEART RATE: 90 BPM

## 2018-11-19 DIAGNOSIS — D17.22 LIPOMA OF LEFT FOREARM: ICD-10-CM

## 2018-11-19 DIAGNOSIS — M25.532 LEFT WRIST PAIN: Primary | ICD-10-CM

## 2018-11-19 PROCEDURE — 73110 X-RAY EXAM OF WRIST: CPT | Performed by: NURSE PRACTITIONER

## 2018-11-19 PROCEDURE — 73110 X-RAY EXAM OF WRIST: CPT | Performed by: RADIOLOGY

## 2018-11-19 PROCEDURE — 99213 OFFICE O/P EST LOW 20 MIN: CPT | Performed by: NURSE PRACTITIONER

## 2018-11-19 RX ORDER — PREDNISONE 20 MG/1
TABLET ORAL
COMMUNITY
Start: 2018-11-18 | End: 2019-05-09

## 2018-11-20 ENCOUNTER — TELEPHONE (OUTPATIENT)
Dept: INTERNAL MEDICINE | Facility: CLINIC | Age: 43
End: 2018-11-20

## 2018-11-20 DIAGNOSIS — M25.532 LEFT WRIST PAIN: Primary | ICD-10-CM

## 2018-11-20 NOTE — TELEPHONE ENCOUNTER
Discussed with patient-c/o worsening pain in left radial wrist (worse after working today), will schedule MRI to further evaluate. She has appt with ortho 12/3.

## 2018-11-20 NOTE — PROGRESS NOTES
Subjective   Jocelyn Reyes is a 42 y.o. female who presents due to left wrist pain.    She c/o left wrist pain for the past several days but dramatically worsened over the weekend (denies acute injury or trauma), she was seen at [facility] yesterday and started on tapering Prednisone with little improvement in sx. Denies numbness/tingling in fingers.      Wrist Pain    The pain is present in the left wrist and left hand. This is a new problem. The current episode started in the past 7 days (sx began last Wednesday). The problem occurs daily. The problem has been rapidly worsening. The quality of the pain is described as aching. The pain is moderate. Associated symptoms include stiffness. Pertinent negatives include no fever, numbness or tingling. Treatments tried: taking Prednisone from Urgent Care. The treatment provided no relief. Family history does not include gout.        The following portions of the patient's history were reviewed and updated as appropriate: allergies, current medications, past social history and problem list.    Past Medical History:   Diagnosis Date   • DVT (deep venous thrombosis) (CMS/[facility])     2010   • Factor 5 Leiden mutation, heterozygous (CMS/[facility])          Current Outpatient Medications:   •  fexofenadine (ALLEGRA) 180 MG tablet, Take 180 mg by mouth as needed., Disp: , Rfl:   •  IBUPROFEN PO, Take  by mouth., Disp: , Rfl:   •  montelukast (SINGULAIR) 10 MG tablet, Take 1 tablet by mouth Every Night., Disp: 30 tablet, Rfl: 3  •  omeprazole OTC (PriLOSEC OTC) 20 MG EC tablet, Take 1 tablet by mouth Daily., Disp: 30 tablet, Rfl: 3  •  predniSONE (DELTASONE) 20 MG tablet, , Disp: , Rfl:   •  Pseudoephedrine HCl (SUDAFED 24 HOUR PO), Take  by mouth., Disp: , Rfl:     No Known Allergies    Review of Systems   Constitutional: Negative for chills, fatigue, fever and unexpected weight change.   HENT: Negative for congestion, ear pain, postnasal drip, sinus pressure, sore throat and trouble  "swallowing.    Respiratory: Negative for cough, chest tightness and wheezing.    Cardiovascular: Negative for chest pain, palpitations and leg swelling.   Gastrointestinal: Negative for abdominal pain and blood in stool.   Genitourinary: Negative for dysuria, frequency and urgency.   Musculoskeletal: Positive for arthralgias and stiffness. Negative for joint swelling.   Skin: Negative for color change.   Neurological: Negative for tingling, syncope, weakness, numbness and headaches.   Hematological: Does not bruise/bleed easily.       Objective   Vitals:    11/19/18 1257   BP: 122/78   BP Location: Left arm   Patient Position: Sitting   Cuff Size: Adult   Pulse: 90   SpO2: 98%   Weight: 91.6 kg (202 lb)   Height: 170.2 cm (67\")     Physical Exam   Constitutional: She appears well-developed and well-nourished. She is cooperative. She does not have a sickly appearance. She does not appear ill.   HENT:   Head: Normocephalic.   Right Ear: Hearing, tympanic membrane and external ear normal.   Left Ear: Hearing, tympanic membrane and external ear normal.   Nose: Nose normal. No mucosal edema, rhinorrhea, sinus tenderness or nasal deformity. Right sinus exhibits no maxillary sinus tenderness and no frontal sinus tenderness. Left sinus exhibits no maxillary sinus tenderness and no frontal sinus tenderness.   Mouth/Throat: Oropharynx is clear and moist and mucous membranes are normal. Normal dentition.   Eyes: Conjunctivae and lids are normal. Right eye exhibits no discharge and no exudate. Left eye exhibits no discharge and no exudate.   Neck: Trachea normal. Carotid bruit is not present. No edema present. No thyroid mass present.   Cardiovascular: Regular rhythm, normal heart sounds and normal pulses.   No murmur heard.  Pulmonary/Chest: Breath sounds normal. No respiratory distress. She has no decreased breath sounds. She has no wheezes. She has no rhonchi. She has no rales.   Musculoskeletal:        Left wrist: She " exhibits decreased range of motion and tenderness.   There is a palpable lesion (suggestive of lipoma) in the left forearm which is tender to palpation, tenderness extends distally into the left radial area   Lymphadenopathy:        Head (right side): No submental, no submandibular, no tonsillar, no preauricular, no posterior auricular and no occipital adenopathy present.        Head (left side): No submental, no submandibular, no tonsillar, no preauricular, no posterior auricular and no occipital adenopathy present.   Neurological: She is alert.   Skin: Skin is warm, dry and intact. No cyanosis. Nails show no clubbing.       Assessment/Plan   Jocelyn was seen today for wrist pain.    Diagnoses and all orders for this visit:    Left wrist pain  -     XR Wrist 3+ View Left  -     Ambulatory Referral to Hand Surgery    Lipoma of left forearm  -     Ambulatory Referral to Hand Surgery    No acute abnl noted on xray-will send for review. I am concerned the lipoma may be large enough that it is causing tendon irritation, will refer to Dr. Rapp for further evaluation.

## 2018-11-20 NOTE — TELEPHONE ENCOUNTER
----- Message from Vicenta Leal sent at 11/20/2018  2:49 PM EST -----  Contact: pt- barrera's pt - RE: MRI  Pt calling and would like a return call to discuss options. She would like to know if an MRI could be done. She informs does not see hand surgeon on 12/03/2018. She would like to do anything between now & then. She informs is really hurting. Could you please call pt to discuss? Please advise. Thanks      Pt # [phone]

## 2018-11-27 ENCOUNTER — HOSPITAL ENCOUNTER (OUTPATIENT)
Dept: MRI IMAGING | Facility: HOSPITAL | Age: 43
Discharge: HOME | End: 2018-11-27
Admitting: NURSE PRACTITIONER

## 2018-11-27 DIAGNOSIS — M25.532 LEFT WRIST PAIN: ICD-10-CM

## 2018-11-27 PROCEDURE — 73221 MRI JOINT UPR EXTREM W/O DYE: CPT

## 2019-04-17 ENCOUNTER — APPOINTMENT (OUTPATIENT)
Dept: WOMENS IMAGING | Facility: HOSPITAL | Age: 44
End: 2019-04-17

## 2019-04-17 PROCEDURE — 77067 SCR MAMMO BI INCL CAD: CPT | Performed by: RADIOLOGY

## 2019-04-17 PROCEDURE — 77063 BREAST TOMOSYNTHESIS BI: CPT | Performed by: RADIOLOGY

## 2019-04-23 ENCOUNTER — TRANSCRIBE ORDERS (OUTPATIENT)
Dept: ADMINISTRATIVE | Facility: HOSPITAL | Age: 44
End: 2019-04-23

## 2019-04-23 DIAGNOSIS — E22.1 HYPERPROLACTINEMIA (HCC): Primary | ICD-10-CM

## 2019-04-23 DIAGNOSIS — O92.6 GALACTORRHEA (CODE): ICD-10-CM

## 2019-04-28 ENCOUNTER — HOSPITAL ENCOUNTER (OUTPATIENT)
Dept: MRI IMAGING | Facility: HOSPITAL | Age: 44
Discharge: HOME | End: 2019-04-28
Admitting: OBSTETRICS & GYNECOLOGY

## 2019-04-28 DIAGNOSIS — O92.6 GALACTORRHEA (CODE): ICD-10-CM

## 2019-04-28 DIAGNOSIS — E22.1 HYPERPROLACTINEMIA (HCC): ICD-10-CM

## 2019-04-28 PROCEDURE — 70553 MRI BRAIN STEM W/O & W/DYE: CPT

## 2019-04-28 PROCEDURE — A9577 INJ MULTIHANCE: HCPCS | Performed by: OBSTETRICS & GYNECOLOGY

## 2019-04-28 PROCEDURE — 0 GADOBENATE DIMEGLUMINE 529 MG/ML SOLUTION: Performed by: OBSTETRICS & GYNECOLOGY

## 2019-04-28 RX ADMIN — GADOBENATE DIMEGLUMINE 20 ML: 529 INJECTION, SOLUTION INTRAVENOUS at 14:22

## 2019-05-09 ENCOUNTER — APPOINTMENT (OUTPATIENT)
Dept: WOMENS IMAGING | Facility: HOSPITAL | Age: 44
End: 2019-05-09

## 2019-05-09 ENCOUNTER — OFFICE VISIT (OUTPATIENT)
Dept: INTERNAL MEDICINE | Facility: CLINIC | Age: 44
End: 2019-05-09

## 2019-05-09 VITALS
SYSTOLIC BLOOD PRESSURE: 120 MMHG | OXYGEN SATURATION: 98 % | HEART RATE: 71 BPM | TEMPERATURE: 98.8 F | BODY MASS INDEX: 32.81 KG/M2 | WEIGHT: 214 LBS | DIASTOLIC BLOOD PRESSURE: 80 MMHG

## 2019-05-09 DIAGNOSIS — R06.02 SHORTNESS OF BREATH: ICD-10-CM

## 2019-05-09 DIAGNOSIS — R05.9 COUGH: Primary | ICD-10-CM

## 2019-05-09 PROCEDURE — 71046 X-RAY EXAM CHEST 2 VIEWS: CPT | Performed by: NURSE PRACTITIONER

## 2019-05-09 PROCEDURE — 71046 X-RAY EXAM CHEST 2 VIEWS: CPT | Performed by: RADIOLOGY

## 2019-05-09 PROCEDURE — 99213 OFFICE O/P EST LOW 20 MIN: CPT | Performed by: NURSE PRACTITIONER

## 2019-05-09 RX ORDER — BENZONATATE 200 MG/1
200 CAPSULE ORAL 3 TIMES DAILY PRN
Qty: 30 CAPSULE | Refills: 0 | Status: SHIPPED | OUTPATIENT
Start: 2019-05-09 | End: 2019-05-24

## 2019-05-09 RX ORDER — ALBUTEROL SULFATE 90 UG/1
2 AEROSOL, METERED RESPIRATORY (INHALATION) EVERY 4 HOURS PRN
Qty: 18 G | Refills: 0 | Status: SHIPPED | OUTPATIENT
Start: 2019-05-09 | End: 2019-10-31

## 2019-05-09 NOTE — PROGRESS NOTES
Subjective   Jocelyn Reyes is a 43 y.o. female.     She presents with cough, fatigue, chest congestion x 3 wks. She was seen in  1 wk ago and was given cefdinir. She feels as if she is getting worse the last 2 days      Cough   This is a new problem. The problem has been gradually worsening. The problem occurs every few minutes. The cough is productive of sputum. Associated symptoms include headaches, rhinorrhea (minor), shortness of breath and wheezing. Pertinent negatives include no chest pain, fever, nasal congestion, postnasal drip or sore throat. Treatments tried: mucinex, abx, allegra, nasal saline, humidifier. The treatment provided mild relief. Her past medical history is significant for environmental allergies.        The following portions of the patient's history were reviewed and updated as appropriate: allergies, current medications, past family history, past medical history, past social history, past surgical history and problem list.    Review of Systems   Constitutional: Negative for fever.   HENT: Positive for rhinorrhea (minor). Negative for postnasal drip and sore throat.    Respiratory: Positive for cough, shortness of breath and wheezing.    Cardiovascular: Negative for chest pain.   Allergic/Immunologic: Positive for environmental allergies.   Neurological: Positive for headaches.       Objective   Physical Exam   Constitutional: She appears well-developed and well-nourished.   HENT:   Head: Normocephalic and atraumatic.   Mouth/Throat: Oropharynx is clear and moist and mucous membranes are normal.   Cardiovascular: Normal rate, regular rhythm and normal heart sounds.   No murmur heard.  Pulmonary/Chest: Effort normal. No respiratory distress. She has no wheezes. She has rhonchi in the left lower field.   Musculoskeletal: Normal range of motion.   Neurological: She is alert.   Skin: Skin is warm and dry.   Psychiatric: She has a normal mood and affect. Her behavior is normal. Judgment and  thought content normal.   Vitals reviewed.      Assessment/Plan   Jocelyn was seen today for cough.    Diagnoses and all orders for this visit:    Cough  -     XR Chest 2 View  -     benzonatate (TESSALON) 200 MG capsule; Take 1 capsule by mouth 3 (Three) Times a Day As Needed for Cough.    Shortness of breath  -     albuterol sulfate HFA (VENTOLIN HFA) 108 (90 Base) MCG/ACT inhaler; Inhale 2 puffs Every 4 (Four) Hours As Needed for Wheezing or Shortness of Air.    A Chest X-Ray was ordered for cough. My reading of this film is no acute findings. (No comparison films available: pending review by Radiologist.)    Add fluticasone nasal spray OTC, 2 sprays each nostril daily throughout illness. Continue taking everyday to prevent further illnesses. Increase fluid intake and rest.    F/u within 2 weeks for rhonchi in lungs.

## 2019-05-16 ENCOUNTER — APPOINTMENT (OUTPATIENT)
Dept: CT IMAGING | Facility: HOSPITAL | Age: 44
End: 2019-05-16

## 2019-05-16 ENCOUNTER — HOSPITAL ENCOUNTER (EMERGENCY)
Facility: HOSPITAL | Age: 44
Discharge: HOME | End: 2019-05-16
Attending: EMERGENCY MEDICINE | Admitting: EMERGENCY MEDICINE

## 2019-05-16 ENCOUNTER — APPOINTMENT (OUTPATIENT)
Dept: GENERAL RADIOLOGY | Facility: HOSPITAL | Age: 44
End: 2019-05-16

## 2019-05-16 VITALS
HEIGHT: 67 IN | HEART RATE: 74 BPM | OXYGEN SATURATION: 97 % | DIASTOLIC BLOOD PRESSURE: 69 MMHG | RESPIRATION RATE: 18 BRPM | TEMPERATURE: 98.9 F | WEIGHT: 215.6 LBS | BODY MASS INDEX: 33.84 KG/M2 | SYSTOLIC BLOOD PRESSURE: 113 MMHG

## 2019-05-16 DIAGNOSIS — J18.9 PNEUMONIA OF LEFT LOWER LOBE DUE TO INFECTIOUS ORGANISM: Primary | ICD-10-CM

## 2019-05-16 LAB
ALBUMIN SERPL-MCNC: 4 G/DL (ref 3.5–5.2)
ALBUMIN/GLOB SERPL: 1.2 G/DL
ALP SERPL-CCNC: 65 U/L (ref 39–117)
ALT SERPL W P-5'-P-CCNC: 16 U/L (ref 1–33)
ANION GAP SERPL CALCULATED.3IONS-SCNC: 10.3 MMOL/L
AST SERPL-CCNC: 15 U/L (ref 1–32)
BASOPHILS # BLD AUTO: 0.04 10*3/MM3 (ref 0–0.2)
BASOPHILS NFR BLD AUTO: 0.3 % (ref 0–1.5)
BILIRUB SERPL-MCNC: 0.4 MG/DL (ref 0.2–1.2)
BUN BLD-MCNC: 10 MG/DL (ref 6–20)
BUN/CREAT SERPL: 12.8 (ref 7–25)
CALCIUM SPEC-SCNC: 9.8 MG/DL (ref 8.6–10.5)
CHLORIDE SERPL-SCNC: 103 MMOL/L (ref 98–107)
CO2 SERPL-SCNC: 23.7 MMOL/L (ref 22–29)
CREAT BLD-MCNC: 0.78 MG/DL (ref 0.57–1)
D DIMER PPP FEU-MCNC: 0.47 MCGFEU/ML (ref 0–0.49)
DEPRECATED RDW RBC AUTO: 43.3 FL (ref 37–54)
EOSINOPHIL # BLD AUTO: 0.39 10*3/MM3 (ref 0–0.4)
EOSINOPHIL NFR BLD AUTO: 2.8 % (ref 0.3–6.2)
ERYTHROCYTE [DISTWIDTH] IN BLOOD BY AUTOMATED COUNT: 12.9 % (ref 12.3–15.4)
GFR SERPL CREATININE-BSD FRML MDRD: 80 ML/MIN/1.73
GLOBULIN UR ELPH-MCNC: 3.3 GM/DL
GLUCOSE BLD-MCNC: 88 MG/DL (ref 65–99)
HCG SERPL QL: NEGATIVE
HCT VFR BLD AUTO: 40.3 % (ref 34–46.6)
HGB BLD-MCNC: 13.5 G/DL (ref 12–15.9)
HOLD SPECIMEN: NORMAL
HOLD SPECIMEN: NORMAL
IMM GRANULOCYTES # BLD AUTO: 0.04 10*3/MM3 (ref 0–0.05)
IMM GRANULOCYTES NFR BLD AUTO: 0.3 % (ref 0–0.5)
LYMPHOCYTES # BLD AUTO: 2.09 10*3/MM3 (ref 0.7–3.1)
LYMPHOCYTES NFR BLD AUTO: 15 % (ref 19.6–45.3)
MCH RBC QN AUTO: 30.7 PG (ref 26.6–33)
MCHC RBC AUTO-ENTMCNC: 33.5 G/DL (ref 31.5–35.7)
MCV RBC AUTO: 91.6 FL (ref 79–97)
MONOCYTES # BLD AUTO: 0.85 10*3/MM3 (ref 0.1–0.9)
MONOCYTES NFR BLD AUTO: 6.1 % (ref 5–12)
NEUTROPHILS # BLD AUTO: 10.53 10*3/MM3 (ref 1.7–7)
NEUTROPHILS NFR BLD AUTO: 75.5 % (ref 42.7–76)
NRBC BLD AUTO-RTO: 0 /100 WBC (ref 0–0.2)
PLATELET # BLD AUTO: 302 10*3/MM3 (ref 140–450)
PMV BLD AUTO: 9.1 FL (ref 6–12)
POTASSIUM BLD-SCNC: 3.8 MMOL/L (ref 3.5–5.2)
PROT SERPL-MCNC: 7.3 G/DL (ref 6–8.5)
RBC # BLD AUTO: 4.4 10*6/MM3 (ref 3.77–5.28)
SODIUM BLD-SCNC: 137 MMOL/L (ref 136–145)
TROPONIN T SERPL-MCNC: <0.01 NG/ML (ref 0–0.03)
WBC NRBC COR # BLD: 13.94 10*3/MM3 (ref 3.4–10.8)
WHOLE BLOOD HOLD SPECIMEN: NORMAL
WHOLE BLOOD HOLD SPECIMEN: NORMAL

## 2019-05-16 PROCEDURE — 93010 ELECTROCARDIOGRAM REPORT: CPT | Performed by: INTERNAL MEDICINE

## 2019-05-16 PROCEDURE — 71275 CT ANGIOGRAPHY CHEST: CPT

## 2019-05-16 PROCEDURE — 25010000002 IOPAMIDOL 61 % SOLUTION: Performed by: EMERGENCY MEDICINE

## 2019-05-16 PROCEDURE — 85379 FIBRIN DEGRADATION QUANT: CPT | Performed by: EMERGENCY MEDICINE

## 2019-05-16 PROCEDURE — 80053 COMPREHEN METABOLIC PANEL: CPT | Performed by: NURSE PRACTITIONER

## 2019-05-16 PROCEDURE — 94640 AIRWAY INHALATION TREATMENT: CPT

## 2019-05-16 PROCEDURE — 85025 COMPLETE CBC W/AUTO DIFF WBC: CPT | Performed by: NURSE PRACTITIONER

## 2019-05-16 PROCEDURE — 94799 UNLISTED PULMONARY SVC/PX: CPT

## 2019-05-16 PROCEDURE — 71046 X-RAY EXAM CHEST 2 VIEWS: CPT

## 2019-05-16 PROCEDURE — 84484 ASSAY OF TROPONIN QUANT: CPT | Performed by: EMERGENCY MEDICINE

## 2019-05-16 PROCEDURE — 84703 CHORIONIC GONADOTROPIN ASSAY: CPT | Performed by: NURSE PRACTITIONER

## 2019-05-16 PROCEDURE — 36415 COLL VENOUS BLD VENIPUNCTURE: CPT

## 2019-05-16 PROCEDURE — 99284 EMERGENCY DEPT VISIT MOD MDM: CPT

## 2019-05-16 PROCEDURE — 93005 ELECTROCARDIOGRAM TRACING: CPT | Performed by: EMERGENCY MEDICINE

## 2019-05-16 RX ORDER — CABERGOLINE 0.5 MG/1
0.25 TABLET ORAL 2 TIMES WEEKLY
COMMUNITY
End: 2021-06-15 | Stop reason: SDUPTHER

## 2019-05-16 RX ORDER — LEVOFLOXACIN 750 MG/1
750 TABLET ORAL ONCE
Status: COMPLETED | OUTPATIENT
Start: 2019-05-16 | End: 2019-05-16

## 2019-05-16 RX ORDER — ALBUTEROL SULFATE 2.5 MG/3ML
2.5 SOLUTION RESPIRATORY (INHALATION) ONCE
Status: COMPLETED | OUTPATIENT
Start: 2019-05-16 | End: 2019-05-16

## 2019-05-16 RX ORDER — LEVOFLOXACIN 750 MG/1
750 TABLET ORAL DAILY
Qty: 5 TABLET | Refills: 0 | Status: SHIPPED | OUTPATIENT
Start: 2019-05-16 | End: 2019-05-24

## 2019-05-16 RX ADMIN — LEVOFLOXACIN 750 MG: 750 TABLET, FILM COATED ORAL at 15:16

## 2019-05-16 RX ADMIN — IOPAMIDOL 85 ML: 612 INJECTION, SOLUTION INTRAVENOUS at 14:12

## 2019-05-16 RX ADMIN — ALBUTEROL SULFATE 2.5 MG: 2.5 SOLUTION RESPIRATORY (INHALATION) at 13:12

## 2019-05-24 ENCOUNTER — OFFICE VISIT (OUTPATIENT)
Dept: INTERNAL MEDICINE | Facility: CLINIC | Age: 44
End: 2019-05-24

## 2019-05-24 VITALS
HEART RATE: 81 BPM | OXYGEN SATURATION: 98 % | WEIGHT: 208.6 LBS | BODY MASS INDEX: 32.74 KG/M2 | DIASTOLIC BLOOD PRESSURE: 72 MMHG | HEIGHT: 67 IN | SYSTOLIC BLOOD PRESSURE: 134 MMHG

## 2019-05-24 DIAGNOSIS — J18.9 PNEUMONIA OF BOTH LOWER LOBES DUE TO INFECTIOUS ORGANISM: Primary | ICD-10-CM

## 2019-05-24 PROCEDURE — 99213 OFFICE O/P EST LOW 20 MIN: CPT | Performed by: NURSE PRACTITIONER

## 2019-05-24 RX ORDER — PREDNISONE 10 MG/1
TABLET ORAL
Qty: 20 TABLET | Refills: 0 | Status: SHIPPED | OUTPATIENT
Start: 2019-05-24 | End: 2019-06-04

## 2019-05-26 NOTE — PROGRESS NOTES
Subjective   Jocelyn Reyes is a 43 y.o. female who presents for f/u regarding pneumonia.    She c/o a recurrent cough for the past 4 weeks which has been persistent but mainly non-productive. She presented to the ER 5/16 after an acute episode of uncontrolled coughing and dyspnea. CT angiogram showed bilateral lower lobe infiltrates and she was treated with Levaquin 750mg qd x5 days. She reports feeling better but still has an intermittent cough.       Pneumonia   She complains of cough and difficulty breathing. There is no chest tightness, hemoptysis or wheezing. This is a recurrent problem. The current episode started 1 to 4 weeks ago. The problem has been rapidly improving. The cough is non-productive. Associated symptoms include chest pain, dyspnea on exertion and malaise/fatigue. Pertinent negatives include no ear pain, fever, headaches, postnasal drip, sore throat or trouble swallowing.        The following portions of the patient's history were reviewed and updated as appropriate: allergies, current medications, past social history and problem list.    Past Medical History:   Diagnosis Date   • DVT (deep venous thrombosis) (CMS/[facility])     2010   • Factor 5 Leiden mutation, heterozygous (CMS/[facility])          Current Outpatient Medications:   •  albuterol sulfate HFA (VENTOLIN HFA) 108 (90 Base) MCG/ACT inhaler, Inhale 2 puffs Every 4 (Four) Hours As Needed for Wheezing or Shortness of Air., Disp: 18 g, Rfl: 0  •  cabergoline (DOSTINEX) 0.5 MG tablet, Take 0.25 mg by mouth 2 (Two) Times a Week., Disp: , Rfl:   •  fexofenadine (ALLEGRA) 180 MG tablet, Take 180 mg by mouth as needed., Disp: , Rfl:   •  IBUPROFEN PO, Take  by mouth., Disp: , Rfl:   •  montelukast (SINGULAIR) 10 MG tablet, Take 1 tablet by mouth Every Night., Disp: 30 tablet, Rfl: 3  •  omeprazole OTC (PriLOSEC OTC) 20 MG EC tablet, Take 1 tablet by mouth Daily., Disp: 30 tablet, Rfl: 3  •  Pseudoephedrine HCl (SUDAFED 24 HOUR PO), Take  by mouth.,  "Disp: , Rfl:   •  predniSONE (DELTASONE) 10 MG tablet, 1 tab tid x3 days then 1 tab bid x3 days then 1 tab qd x5 days, Disp: 20 tablet, Rfl: 0    No Known Allergies    Review of Systems   Constitutional: Positive for fatigue and malaise/fatigue. Negative for chills, fever and unexpected weight change.   HENT: Positive for congestion. Negative for ear pain, postnasal drip, sinus pressure, sore throat and trouble swallowing.    Eyes: Negative for visual disturbance.   Respiratory: Positive for cough and chest tightness. Negative for hemoptysis and wheezing.    Cardiovascular: Positive for chest pain and dyspnea on exertion. Negative for palpitations and leg swelling.   Gastrointestinal: Negative for abdominal pain, blood in stool, nausea and vomiting.   Genitourinary: Negative for dysuria, frequency and urgency.   Musculoskeletal: Negative for arthralgias and joint swelling.   Skin: Negative for color change.   Neurological: Negative for syncope, weakness and headaches.   Hematological: Does not bruise/bleed easily.       Objective   Vitals:    05/24/19 0952   BP: 134/72   BP Location: Left arm   Patient Position: Sitting   Cuff Size: Adult   Pulse: 81   SpO2: 98%   Weight: 94.6 kg (208 lb 9.6 oz)   Height: 170.2 cm (67\")     Physical Exam   Constitutional: She appears well-developed and well-nourished. She is cooperative. She does not have a sickly appearance. She does not appear ill.   HENT:   Head: Normocephalic.   Right Ear: Hearing and external ear normal. No drainage, swelling or tenderness. Tympanic membrane is bulging. Tympanic membrane is not erythematous. No middle ear effusion. No decreased hearing is noted.   Left Ear: Hearing and external ear normal. No drainage, swelling or tenderness. Tympanic membrane is bulging. Tympanic membrane is not erythematous.  No middle ear effusion. No decreased hearing is noted.   Nose: Nose normal. No mucosal edema, rhinorrhea, sinus tenderness or nasal deformity. Right " sinus exhibits no maxillary sinus tenderness and no frontal sinus tenderness. Left sinus exhibits no maxillary sinus tenderness and no frontal sinus tenderness.   Mouth/Throat: Mucous membranes are normal. Posterior oropharyngeal erythema present.   Eyes: Conjunctivae and lids are normal.   Neck: Trachea normal.   Cardiovascular: Regular rhythm, normal heart sounds and normal pulses.   No murmur heard.  Pulmonary/Chest: Breath sounds normal. No respiratory distress. She has no decreased breath sounds. She has no wheezes. She has no rhonchi. She has no rales.   Lymphadenopathy:     She has no cervical adenopathy.   Neurological: She is alert.   Skin: Skin is warm, dry and intact.   Nursing note and vitals reviewed.      Assessment/Plan   Jocelyn was seen today for pneumonia.    Diagnoses and all orders for this visit:    Pneumonia of both lower lobes due to infectious organism (CMS/[facility])  -     predniSONE (DELTASONE) 10 MG tablet; 1 tab tid x3 days then 1 tab bid x3 days then 1 tab qd x5 days    Reviewed CXR and CT angiogram from ER visit, she has completed Levaquin and is feeling better. However, she continues to c/o a dry cough (taking Montelukast and Tessalon Perles), will treat with tapering Prednisone and continue to monitor.  She will be due for repeat CT in 4 weeks, has an appt with pulm to further evaluate.

## 2019-06-14 ENCOUNTER — TELEPHONE (OUTPATIENT)
Dept: INTERNAL MEDICINE | Facility: CLINIC | Age: 44
End: 2019-06-14

## 2019-06-14 DIAGNOSIS — J18.9 PNEUMONIA OF BOTH LOWER LOBES DUE TO INFECTIOUS ORGANISM: Primary | ICD-10-CM

## 2019-06-14 NOTE — TELEPHONE ENCOUNTER
----- Message from Vicenta Leal sent at 6/14/2019  9:04 AM EDT -----  Contact: pt - Savita's pt - RE: CT scan  Pt calling and would like a return call regarding f/ up on pneumonia. She's calling to see if repeat CT scan can be done prior to appt. Could you please call pt to discuss? Could you please put order in chart? Please advise. Thanks      Pt # [phone]

## 2019-06-18 ENCOUNTER — HOSPITAL ENCOUNTER (OUTPATIENT)
Dept: CT IMAGING | Facility: HOSPITAL | Age: 44
Discharge: HOME | End: 2019-06-18
Admitting: NURSE PRACTITIONER

## 2019-06-18 DIAGNOSIS — J18.9 PNEUMONIA OF BOTH LOWER LOBES DUE TO INFECTIOUS ORGANISM: ICD-10-CM

## 2019-06-18 PROCEDURE — 25010000002 IOPAMIDOL 61 % SOLUTION: Performed by: NURSE PRACTITIONER

## 2019-06-18 PROCEDURE — 71260 CT THORAX DX C+: CPT

## 2019-06-18 RX ADMIN — IOPAMIDOL 75 ML: 612 INJECTION, SOLUTION INTRAVENOUS at 16:09

## 2019-06-21 ENCOUNTER — OFFICE VISIT (OUTPATIENT)
Dept: INTERNAL MEDICINE | Facility: CLINIC | Age: 44
End: 2019-06-21

## 2019-06-21 VITALS
WEIGHT: 212 LBS | BODY MASS INDEX: 33.2 KG/M2 | HEART RATE: 78 BPM | TEMPERATURE: 98.9 F | DIASTOLIC BLOOD PRESSURE: 78 MMHG | OXYGEN SATURATION: 98 % | SYSTOLIC BLOOD PRESSURE: 108 MMHG

## 2019-06-21 DIAGNOSIS — J18.9 PNEUMONIA OF BOTH LOWER LOBES DUE TO INFECTIOUS ORGANISM: Primary | ICD-10-CM

## 2019-06-21 DIAGNOSIS — R05.3 PERSISTENT COUGH: ICD-10-CM

## 2019-06-21 PROCEDURE — 99213 OFFICE O/P EST LOW 20 MIN: CPT | Performed by: NURSE PRACTITIONER

## 2019-06-21 RX ORDER — GUAIFENESIN 600 MG/1
600 TABLET, EXTENDED RELEASE ORAL EVERY 12 HOURS SCHEDULED
Qty: 14 TABLET
Start: 2019-06-21 | End: 2019-06-28

## 2019-06-21 RX ORDER — MONTELUKAST SODIUM 10 MG/1
10 TABLET ORAL NIGHTLY
Qty: 30 TABLET | Refills: 3 | Status: SHIPPED | OUTPATIENT
Start: 2019-06-21 | End: 2020-08-19 | Stop reason: SDUPTHER

## 2019-06-22 NOTE — PROGRESS NOTES
Subjective   Jocelyn Reyes is a 43 y.o. female who presents for f/u regarding pneumonia. She continues to c/o an intermittent cough.    She was diagnosed with bilateral lower lobe pneumonia 5/2019 in the ER. She reports improvement in her cough, still with intermittent cough which is dry. She has noticed needing her Albuterol inhaler after walking up 2 flights of stairs. Denies fever/chills. No wheezing.      Pneumonia   She complains of cough. There is no wheezing. This is a new problem. The current episode started 1 to 4 weeks ago. The problem has been gradually improving. Pertinent negatives include no chest pain, ear pain, fever, headaches, postnasal drip, sore throat or trouble swallowing.        The following portions of the patient's history were reviewed and updated as appropriate: allergies, current medications, past social history and problem list.    Past Medical History:   Diagnosis Date   • DVT (deep venous thrombosis) (CMS/[facility])     2010   • Factor 5 Leiden mutation, heterozygous (CMS/[facility])          Current Outpatient Medications:   •  albuterol sulfate HFA (VENTOLIN HFA) 108 (90 Base) MCG/ACT inhaler, Inhale 2 puffs Every 4 (Four) Hours As Needed for Wheezing or Shortness of Air., Disp: 18 g, Rfl: 0  •  cabergoline (DOSTINEX) 0.5 MG tablet, Take 0.25 mg by mouth 2 (Two) Times a Week., Disp: , Rfl:   •  cabergoline (DOSTINEX) 0.5 MG tablet, Take 1/2 tablet by mouth 2 times a week, Disp: 16 tablet, Rfl: 0  •  fexofenadine (ALLEGRA) 180 MG tablet, Take 180 mg by mouth as needed., Disp: , Rfl:   •  guaiFENesin (MUCINEX) 600 MG 12 hr tablet, Take 1 tablet by mouth Every 12 (Twelve) Hours for 7 days., Disp: 14 tablet, Rfl:   •  IBUPROFEN PO, Take  by mouth., Disp: , Rfl:   •  mometasone-formoterol (DULERA 100) 100-5 MCG/ACT inhaler, Inhale 2 puffs 2 (Two) Times a Day for 14 days. Rinse and spit after use, Disp: 1 inhaler, Rfl: 0  •  montelukast (SINGULAIR) 10 MG tablet, Take 1 tablet by mouth Every Night.,  Disp: 30 tablet, Rfl: 3  •  omeprazole OTC (PriLOSEC OTC) 20 MG EC tablet, Take 1 tablet by mouth Daily., Disp: 30 tablet, Rfl: 3  •  Pseudoephedrine HCl (SUDAFED 24 HOUR PO), Take  by mouth., Disp: , Rfl:     No Known Allergies    Review of Systems   Constitutional: Negative for chills, fatigue, fever and unexpected weight change.   HENT: Negative for congestion, ear pain, postnasal drip, sinus pressure, sore throat and trouble swallowing.    Eyes: Negative for visual disturbance.   Respiratory: Positive for cough. Negative for chest tightness and wheezing.    Cardiovascular: Negative for chest pain, palpitations and leg swelling.   Gastrointestinal: Negative for abdominal pain, blood in stool, nausea and vomiting.   Genitourinary: Negative for dysuria, frequency and urgency.   Musculoskeletal: Negative for arthralgias and joint swelling.   Skin: Negative for color change.   Neurological: Negative for syncope, weakness and headaches.   Hematological: Does not bruise/bleed easily.       Objective   Vitals:    06/21/19 1518   BP: 108/78   BP Location: Left arm   Patient Position: Sitting   Cuff Size: Adult   Pulse: 78   Temp: 98.9 °F (37.2 °C)   SpO2: 98%   Weight: 96.2 kg (212 lb)     Physical Exam   Constitutional: She appears well-developed and well-nourished. She is cooperative. She does not have a sickly appearance. She does not appear ill.   HENT:   Head: Normocephalic.   Right Ear: Hearing, tympanic membrane and external ear normal.   Left Ear: Hearing, tympanic membrane and external ear normal.   Nose: Nose normal. No mucosal edema, rhinorrhea, sinus tenderness or nasal deformity. Right sinus exhibits no maxillary sinus tenderness and no frontal sinus tenderness. Left sinus exhibits no maxillary sinus tenderness and no frontal sinus tenderness.   Mouth/Throat: Oropharynx is clear and moist and mucous membranes are normal. Normal dentition.   Eyes: Conjunctivae and lids are normal. Right eye exhibits no  discharge and no exudate. Left eye exhibits no discharge and no exudate.   Neck: Trachea normal. Carotid bruit is not present. No edema present. No thyroid mass present.   Cardiovascular: Regular rhythm, normal heart sounds and normal pulses.   No murmur heard.  Pulmonary/Chest: Breath sounds normal. No respiratory distress. She has no decreased breath sounds. She has no wheezes. She has no rhonchi. She has no rales.   Lymphadenopathy:        Head (right side): No submental, no submandibular, no tonsillar, no preauricular, no posterior auricular and no occipital adenopathy present.        Head (left side): No submental, no submandibular, no tonsillar, no preauricular, no posterior auricular and no occipital adenopathy present.   Neurological: She is alert.   Skin: Skin is warm, dry and intact. No cyanosis. Nails show no clubbing.       Assessment/Plan   Jocelyn was seen today for pneumonia.    Diagnoses and all orders for this visit:    Pneumonia of both lower lobes due to infectious organism (CMS/[facility])  -     guaiFENesin (MUCINEX) 600 MG 12 hr tablet; Take 1 tablet by mouth Every 12 (Twelve) Hours for 7 days.  -     mometasone-formoterol (DULERA 100) 100-5 MCG/ACT inhaler; Inhale 2 puffs 2 (Two) Times a Day for 14 days. Rinse and spit after use    Reviewed results of CT scan chest from 6/18 which show complete resolution of pneumonia. Discussed using Albuterol as needed, will add Dulera for 2 weeks due to residual bronchospasm.

## 2019-09-25 ENCOUNTER — LAB (OUTPATIENT)
Dept: LAB | Facility: HOSPITAL | Age: 44
End: 2019-09-25

## 2019-09-25 ENCOUNTER — TRANSCRIBE ORDERS (OUTPATIENT)
Dept: ADMINISTRATIVE | Facility: HOSPITAL | Age: 44
End: 2019-09-25

## 2019-09-25 DIAGNOSIS — E22.1 HYPERPROLACTINEMIA (HCC): Primary | ICD-10-CM

## 2019-09-25 DIAGNOSIS — E22.1 HYPERPROLACTINEMIA (HCC): ICD-10-CM

## 2019-09-25 LAB — PROLACTIN SERPL-MCNC: 6.86 NG/ML (ref 4.79–23.3)

## 2019-09-25 PROCEDURE — 36415 COLL VENOUS BLD VENIPUNCTURE: CPT

## 2019-09-25 PROCEDURE — 84146 ASSAY OF PROLACTIN: CPT

## 2019-10-15 RX ORDER — CABERGOLINE 0.5 MG/1
TABLET ORAL
Qty: 16 TABLET | Refills: 0 | Status: CANCELLED | OUTPATIENT
Start: 2019-10-15

## 2019-10-25 ENCOUNTER — HOSPITAL ENCOUNTER (OUTPATIENT)
Dept: CARDIOLOGY | Facility: HOSPITAL | Age: 44
Discharge: HOME | End: 2019-10-25
Admitting: NURSE PRACTITIONER

## 2019-10-25 ENCOUNTER — HOSPITAL ENCOUNTER (OUTPATIENT)
Dept: CARDIOLOGY | Facility: HOSPITAL | Age: 44
Discharge: HOME | End: 2019-10-25

## 2019-10-25 ENCOUNTER — OFFICE VISIT (OUTPATIENT)
Dept: INTERNAL MEDICINE | Facility: CLINIC | Age: 44
End: 2019-10-25

## 2019-10-25 VITALS
BODY MASS INDEX: 29.35 KG/M2 | HEIGHT: 67 IN | DIASTOLIC BLOOD PRESSURE: 78 MMHG | HEART RATE: 61 BPM | TEMPERATURE: 98.6 F | WEIGHT: 187 LBS | OXYGEN SATURATION: 97 % | SYSTOLIC BLOOD PRESSURE: 112 MMHG

## 2019-10-25 DIAGNOSIS — R00.2 PALPITATIONS: ICD-10-CM

## 2019-10-25 DIAGNOSIS — D68.51 FACTOR V LEIDEN (HCC): ICD-10-CM

## 2019-10-25 DIAGNOSIS — E78.5 HYPERLIPIDEMIA, UNSPECIFIED HYPERLIPIDEMIA TYPE: ICD-10-CM

## 2019-10-25 DIAGNOSIS — R00.2 PALPITATIONS: Primary | ICD-10-CM

## 2019-10-25 PROBLEM — Z86.718 HISTORY OF DVT (DEEP VEIN THROMBOSIS): Status: ACTIVE | Noted: 2019-10-25

## 2019-10-25 LAB
ALBUMIN SERPL-MCNC: 4.8 G/DL (ref 3.5–5.2)
ALBUMIN/GLOB SERPL: 1.8 G/DL
ALP SERPL-CCNC: 53 U/L (ref 39–117)
ALT SERPL W P-5'-P-CCNC: 18 U/L (ref 1–33)
ANION GAP SERPL CALCULATED.3IONS-SCNC: 15.6 MMOL/L (ref 5–15)
AST SERPL-CCNC: 19 U/L (ref 1–32)
BASOPHILS # BLD AUTO: 0.03 10*3/MM3 (ref 0–0.2)
BASOPHILS NFR BLD AUTO: 0.5 % (ref 0–1.5)
BH CV ECHO MEAS - ACS: 1.6 CM
BH CV ECHO MEAS - AO MAX PG (FULL): 2.8 MMHG
BH CV ECHO MEAS - AO MAX PG: 6.8 MMHG
BH CV ECHO MEAS - AO MEAN PG (FULL): 2 MMHG
BH CV ECHO MEAS - AO MEAN PG: 4 MMHG
BH CV ECHO MEAS - AO ROOT AREA (BSA CORRECTED): 1.3
BH CV ECHO MEAS - AO ROOT AREA: 5.3 CM^2
BH CV ECHO MEAS - AO ROOT DIAM: 2.6 CM
BH CV ECHO MEAS - AO V2 MAX: 130 CM/SEC
BH CV ECHO MEAS - AO V2 MEAN: 93 CM/SEC
BH CV ECHO MEAS - AO V2 VTI: 30 CM
BH CV ECHO MEAS - AVA(I,A): 2.3 CM^2
BH CV ECHO MEAS - AVA(I,D): 2.3 CM^2
BH CV ECHO MEAS - AVA(V,A): 2.4 CM^2
BH CV ECHO MEAS - AVA(V,D): 2.4 CM^2
BH CV ECHO MEAS - BSA(HAYCOCK): 2 M^2
BH CV ECHO MEAS - BSA: 2 M^2
BH CV ECHO MEAS - BZI_BMI: 29.3 KILOGRAMS/M^2
BH CV ECHO MEAS - BZI_METRIC_HEIGHT: 170.2 CM
BH CV ECHO MEAS - BZI_METRIC_WEIGHT: 84.8 KG
BH CV ECHO MEAS - EDV(CUBED): 117.6 ML
BH CV ECHO MEAS - EDV(MOD-SP2): 68 ML
BH CV ECHO MEAS - EDV(MOD-SP4): 104 ML
BH CV ECHO MEAS - EDV(TEICH): 112.8 ML
BH CV ECHO MEAS - EF(CUBED): 79.3 %
BH CV ECHO MEAS - EF(MOD-BP): 65 %
BH CV ECHO MEAS - EF(MOD-SP2): 67.6 %
BH CV ECHO MEAS - EF(MOD-SP4): 66.3 %
BH CV ECHO MEAS - EF(TEICH): 71.4 %
BH CV ECHO MEAS - ESV(CUBED): 24.4 ML
BH CV ECHO MEAS - ESV(MOD-SP2): 22 ML
BH CV ECHO MEAS - ESV(MOD-SP4): 35 ML
BH CV ECHO MEAS - ESV(TEICH): 32.2 ML
BH CV ECHO MEAS - FS: 40.8 %
BH CV ECHO MEAS - IVS/LVPW: 1
BH CV ECHO MEAS - IVSD: 0.8 CM
BH CV ECHO MEAS - LAT PEAK E' VEL: 18.2 CM/SEC
BH CV ECHO MEAS - LV DIASTOLIC VOL/BSA (35-75): 52.9 ML/M^2
BH CV ECHO MEAS - LV MASS(C)D: 131.2 GRAMS
BH CV ECHO MEAS - LV MASS(C)DI: 66.8 GRAMS/M^2
BH CV ECHO MEAS - LV MAX PG: 4 MMHG
BH CV ECHO MEAS - LV MEAN PG: 2 MMHG
BH CV ECHO MEAS - LV SYSTOLIC VOL/BSA (12-30): 17.8 ML/M^2
BH CV ECHO MEAS - LV V1 MAX: 100 CM/SEC
BH CV ECHO MEAS - LV V1 MEAN: 64.4 CM/SEC
BH CV ECHO MEAS - LV V1 VTI: 21.7 CM
BH CV ECHO MEAS - LVIDD: 4.9 CM
BH CV ECHO MEAS - LVIDS: 2.9 CM
BH CV ECHO MEAS - LVLD AP2: 6.8 CM
BH CV ECHO MEAS - LVLD AP4: 6.9 CM
BH CV ECHO MEAS - LVLS AP2: 5 CM
BH CV ECHO MEAS - LVLS AP4: 5.6 CM
BH CV ECHO MEAS - LVOT AREA (M): 3.1 CM^2
BH CV ECHO MEAS - LVOT AREA: 3.1 CM^2
BH CV ECHO MEAS - LVOT DIAM: 2 CM
BH CV ECHO MEAS - LVPWD: 0.8 CM
BH CV ECHO MEAS - MED PEAK E' VEL: 10.2 CM/SEC
BH CV ECHO MEAS - MV A DUR: 0.18 SEC
BH CV ECHO MEAS - MV A MAX VEL: 76.6 CM/SEC
BH CV ECHO MEAS - MV DEC SLOPE: 519 CM/SEC^2
BH CV ECHO MEAS - MV DEC TIME: 0.19 SEC
BH CV ECHO MEAS - MV E MAX VEL: 85.4 CM/SEC
BH CV ECHO MEAS - MV E/A: 1.1
BH CV ECHO MEAS - MV MAX PG: 4 MMHG
BH CV ECHO MEAS - MV MEAN PG: 2 MMHG
BH CV ECHO MEAS - MV P1/2T MAX VEL: 97.3 CM/SEC
BH CV ECHO MEAS - MV P1/2T: 54.9 MSEC
BH CV ECHO MEAS - MV V2 MAX: 100 CM/SEC
BH CV ECHO MEAS - MV V2 MEAN: 57.3 CM/SEC
BH CV ECHO MEAS - MV V2 VTI: 32.2 CM
BH CV ECHO MEAS - MVA P1/2T LCG: 2.3 CM^2
BH CV ECHO MEAS - MVA(P1/2T): 4 CM^2
BH CV ECHO MEAS - MVA(VTI): 2.1 CM^2
BH CV ECHO MEAS - PA ACC TIME: 0.12 SEC
BH CV ECHO MEAS - PA MAX PG (FULL): 2.2 MMHG
BH CV ECHO MEAS - PA MAX PG: 4.2 MMHG
BH CV ECHO MEAS - PA PR(ACCEL): 26.8 MMHG
BH CV ECHO MEAS - PA V2 MAX: 102 CM/SEC
BH CV ECHO MEAS - PI END-D VEL: 118 CM/SEC
BH CV ECHO MEAS - PULM A REVS DUR: 0.19 SEC
BH CV ECHO MEAS - PULM A REVS VEL: 25.7 CM/SEC
BH CV ECHO MEAS - PULM DIAS VEL: 58.8 CM/SEC
BH CV ECHO MEAS - PULM S/D: 0.65
BH CV ECHO MEAS - PULM SYS VEL: 38.1 CM/SEC
BH CV ECHO MEAS - PVA(V,A): 2.4 CM^2
BH CV ECHO MEAS - PVA(V,D): 2.4 CM^2
BH CV ECHO MEAS - QP/QS: 0.77
BH CV ECHO MEAS - RAP SYSTOLE: 3 MMHG
BH CV ECHO MEAS - RV MAX PG: 1.9 MMHG
BH CV ECHO MEAS - RV MEAN PG: 1 MMHG
BH CV ECHO MEAS - RV V1 MAX: 69.5 CM/SEC
BH CV ECHO MEAS - RV V1 MEAN: 49.2 CM/SEC
BH CV ECHO MEAS - RV V1 VTI: 15.2 CM
BH CV ECHO MEAS - RVOT AREA: 3.5 CM^2
BH CV ECHO MEAS - RVOT DIAM: 2.1 CM
BH CV ECHO MEAS - RVSP: 20 MMHG
BH CV ECHO MEAS - SI(AO): 81.1 ML/M^2
BH CV ECHO MEAS - SI(CUBED): 47.5 ML/M^2
BH CV ECHO MEAS - SI(LVOT): 34.7 ML/M^2
BH CV ECHO MEAS - SI(MOD-SP2): 23.4 ML/M^2
BH CV ECHO MEAS - SI(MOD-SP4): 35.1 ML/M^2
BH CV ECHO MEAS - SI(TEICH): 41 ML/M^2
BH CV ECHO MEAS - SV(AO): 159.3 ML
BH CV ECHO MEAS - SV(CUBED): 93.3 ML
BH CV ECHO MEAS - SV(LVOT): 68.2 ML
BH CV ECHO MEAS - SV(MOD-SP2): 46 ML
BH CV ECHO MEAS - SV(MOD-SP4): 69 ML
BH CV ECHO MEAS - SV(RVOT): 52.6 ML
BH CV ECHO MEAS - SV(TEICH): 80.6 ML
BH CV ECHO MEAS - TAPSE (>1.6): 2.5 CM2
BH CV ECHO MEAS - TR MAX VEL: 206 CM/SEC
BH CV ECHO MEASUREMENTS AVERAGE E/E' RATIO: 6.01
BH CV XLRA - RV BASE: 3 CM
BH CV XLRA - RV LENGTH: 6.6 CM
BH CV XLRA - RV MID: 3.3 CM
BH CV XLRA - TDI S': 13.7 CM/SEC
BILIRUB SERPL-MCNC: 0.6 MG/DL (ref 0.2–1.2)
BUN BLD-MCNC: 11 MG/DL (ref 6–20)
BUN/CREAT SERPL: 13.9 (ref 7–25)
CALCIUM SPEC-SCNC: 9.4 MG/DL (ref 8.6–10.5)
CHLORIDE SERPL-SCNC: 99 MMOL/L (ref 98–107)
CHOLEST SERPL-MCNC: 217 MG/DL (ref 0–200)
CO2 SERPL-SCNC: 25.4 MMOL/L (ref 22–29)
CREAT BLD-MCNC: 0.79 MG/DL (ref 0.57–1)
DEPRECATED RDW RBC AUTO: 44 FL (ref 37–54)
EOSINOPHIL # BLD AUTO: 0.16 10*3/MM3 (ref 0–0.4)
EOSINOPHIL NFR BLD AUTO: 2.6 % (ref 0.3–6.2)
ERYTHROCYTE [DISTWIDTH] IN BLOOD BY AUTOMATED COUNT: 13.5 % (ref 12.3–15.4)
GFR SERPL CREATININE-BSD FRML MDRD: 79 ML/MIN/1.73
GLOBULIN UR ELPH-MCNC: 2.6 GM/DL
GLUCOSE BLD-MCNC: 99 MG/DL (ref 65–99)
HCT VFR BLD AUTO: 44.1 % (ref 34–46.6)
HDLC SERPL-MCNC: 48 MG/DL (ref 40–60)
HGB BLD-MCNC: 15 G/DL (ref 12–15.9)
LDLC SERPL CALC-MCNC: 150 MG/DL (ref 0–100)
LDLC/HDLC SERPL: 3.12 {RATIO}
LEFT ATRIUM VOLUME INDEX: 20 ML/M2
LYMPHOCYTES # BLD AUTO: 1.86 10*3/MM3 (ref 0.7–3.1)
LYMPHOCYTES NFR BLD AUTO: 30.6 % (ref 19.6–45.3)
MAXIMAL PREDICTED HEART RATE: 175 BPM
MCH RBC QN AUTO: 30.7 PG (ref 26.6–33)
MCHC RBC AUTO-ENTMCNC: 34 G/DL (ref 31.5–35.7)
MCV RBC AUTO: 90.4 FL (ref 79–97)
MONOCYTES # BLD AUTO: 0.34 10*3/MM3 (ref 0.1–0.9)
MONOCYTES NFR BLD AUTO: 5.6 % (ref 5–12)
NEUTROPHILS # BLD AUTO: 3.68 10*3/MM3 (ref 1.7–7)
NEUTROPHILS NFR BLD AUTO: 60.7 % (ref 42.7–76)
PLATELET # BLD AUTO: 288 10*3/MM3 (ref 140–450)
PMV BLD AUTO: 10.3 FL (ref 6–12)
POTASSIUM BLD-SCNC: 3.6 MMOL/L (ref 3.5–5.2)
PROT SERPL-MCNC: 7.4 G/DL (ref 6–8.5)
RBC # BLD AUTO: 4.88 10*6/MM3 (ref 3.77–5.28)
SODIUM BLD-SCNC: 140 MMOL/L (ref 136–145)
STRESS TARGET HR: 149 BPM
TRIGL SERPL-MCNC: 96 MG/DL (ref 0–150)
TSH SERPL DL<=0.05 MIU/L-ACNC: 1.62 UIU/ML (ref 0.27–4.2)
VLDLC SERPL-MCNC: 19.2 MG/DL (ref 5–40)
WBC NRBC COR # BLD: 6.07 10*3/MM3 (ref 3.4–10.8)

## 2019-10-25 PROCEDURE — 84443 ASSAY THYROID STIM HORMONE: CPT | Performed by: NURSE PRACTITIONER

## 2019-10-25 PROCEDURE — 85025 COMPLETE CBC W/AUTO DIFF WBC: CPT | Performed by: NURSE PRACTITIONER

## 2019-10-25 PROCEDURE — 80053 COMPREHEN METABOLIC PANEL: CPT | Performed by: NURSE PRACTITIONER

## 2019-10-25 PROCEDURE — 36415 COLL VENOUS BLD VENIPUNCTURE: CPT | Performed by: NURSE PRACTITIONER

## 2019-10-25 PROCEDURE — 99214 OFFICE O/P EST MOD 30 MIN: CPT | Performed by: NURSE PRACTITIONER

## 2019-10-25 PROCEDURE — 93225 XTRNL ECG REC<48 HRS REC: CPT

## 2019-10-25 PROCEDURE — 93306 TTE W/DOPPLER COMPLETE: CPT

## 2019-10-25 PROCEDURE — 93000 ELECTROCARDIOGRAM COMPLETE: CPT | Performed by: NURSE PRACTITIONER

## 2019-10-25 PROCEDURE — 93226 XTRNL ECG REC<48 HR SCAN A/R: CPT

## 2019-10-25 PROCEDURE — 80061 LIPID PANEL: CPT | Performed by: NURSE PRACTITIONER

## 2019-10-25 PROCEDURE — 93306 TTE W/DOPPLER COMPLETE: CPT | Performed by: INTERNAL MEDICINE

## 2019-10-26 PROCEDURE — 93227 XTRNL ECG REC<48 HR R&I: CPT | Performed by: INTERNAL MEDICINE

## 2019-10-26 NOTE — PROGRESS NOTES
Subjective   Jocelyn Reyes is a 43 y.o. female who presents due to palpitations.    She presents due to palpitations with bradycardia for the past 2 weeks. She has lost 28# since August due to following a low-carb diet. She does c/o increased fatigue throughout the day with diffuse body aches. Her Fit Bit has shown bradycardia, HR 50s throughout the day and dropping the 40s at night.      Palpitations    This is a recurrent problem. The current episode started 1 to 4 weeks ago. The problem occurs intermittently. The problem has been waxing and waning. Associated symptoms include dizziness, an irregular heartbeat and malaise/fatigue. Pertinent negatives include no chest pain, coughing, fever, nausea, shortness of breath, vomiting or weakness.        The following portions of the patient's history were reviewed and updated as appropriate: allergies, current medications, past social history and problem list.    Past Medical History:   Diagnosis Date   • DVT (deep venous thrombosis) (CMS/[facility])     2010   • Factor 5 Leiden mutation, heterozygous (CMS/[facility])          Current Outpatient Medications:   •  albuterol sulfate HFA (VENTOLIN HFA) 108 (90 Base) MCG/ACT inhaler, Inhale 2 puffs Every 4 (Four) Hours As Needed for Wheezing or Shortness of Air., Disp: 18 g, Rfl: 0  •  cabergoline (DOSTINEX) 0.5 MG tablet, Take 0.25 mg by mouth 2 (Two) Times a Week., Disp: , Rfl:   •  fexofenadine (ALLEGRA) 180 MG tablet, Take 180 mg by mouth as needed., Disp: , Rfl:   •  IBUPROFEN PO, Take  by mouth., Disp: , Rfl:   •  montelukast (SINGULAIR) 10 MG tablet, Take 1 tablet by mouth Every Night., Disp: 30 tablet, Rfl: 3  •  omeprazole OTC (PriLOSEC OTC) 20 MG EC tablet, Take 1 tablet by mouth Daily., Disp: 30 tablet, Rfl: 3  •  cabergoline (DOSTINEX) 0.5 MG tablet, Take 0.5 tablets by mouth twice weekly, Disp: 8 tablet, Rfl: 0    No Known Allergies    Review of Systems   Constitutional: Positive for fatigue and malaise/fatigue. Negative for  "chills, fever and unexpected weight change.   HENT: Negative for congestion, ear pain, postnasal drip, sinus pressure, sore throat and trouble swallowing.    Eyes: Negative for visual disturbance.   Respiratory: Negative for cough, chest tightness, shortness of breath and wheezing.    Cardiovascular: Positive for palpitations. Negative for chest pain and leg swelling.   Gastrointestinal: Negative for abdominal pain, blood in stool, nausea and vomiting.   Genitourinary: Negative for dysuria, frequency and urgency.   Musculoskeletal: Negative for arthralgias and joint swelling.   Skin: Negative for color change.   Neurological: Positive for dizziness and light-headedness. Negative for syncope, weakness and headaches.   Hematological: Does not bruise/bleed easily.       Objective   Vitals:    10/25/19 0836   BP: 112/78   BP Location: Left arm   Patient Position: Sitting   Cuff Size: Adult   Pulse: 61   Temp: 98.6 °F (37 °C)   TempSrc: Oral   SpO2: 97%   Weight: 84.8 kg (187 lb)   Height: 170.2 cm (67\")     Body mass index is 29.29 kg/m².    Physical Exam   Constitutional: She appears well-developed and well-nourished. She is cooperative. She does not have a sickly appearance. She does not appear ill.   HENT:   Head: Normocephalic.   Right Ear: Hearing, tympanic membrane and external ear normal.   Left Ear: Hearing, tympanic membrane and external ear normal.   Nose: Nose normal. No mucosal edema, rhinorrhea, sinus tenderness or nasal deformity. Right sinus exhibits no maxillary sinus tenderness and no frontal sinus tenderness. Left sinus exhibits no maxillary sinus tenderness and no frontal sinus tenderness.   Mouth/Throat: Oropharynx is clear and moist and mucous membranes are normal. Normal dentition.   Eyes: Conjunctivae and lids are normal. Right eye exhibits no discharge and no exudate. Left eye exhibits no discharge and no exudate.   Neck: Trachea normal. Carotid bruit is not present. No edema present. No thyroid " mass present.   Cardiovascular: Regular rhythm, normal heart sounds and normal pulses.   No murmur heard.  Pulmonary/Chest: Breath sounds normal. No respiratory distress. She has no decreased breath sounds. She has no wheezes. She has no rhonchi. She has no rales.   Lymphadenopathy:        Head (right side): No submental, no submandibular, no tonsillar, no preauricular, no posterior auricular and no occipital adenopathy present.        Head (left side): No submental, no submandibular, no tonsillar, no preauricular, no posterior auricular and no occipital adenopathy present.   Neurological: She is alert.   Skin: Skin is warm, dry and intact. No cyanosis. Nails show no clubbing.       Assessment/Plan   Jocelyn was seen today for palpitations.    Diagnoses and all orders for this visit:    Palpitations  -     Holter Monitor - 24 Hour; Future  -     Adult Transthoracic Echo Complete W/ Cont if Necessary Per Protocol; Future  -     CBC Auto Differential; Future  -     Comprehensive Metabolic Panel; Future  -     TSH; Future  -     CBC Auto Differential  -     Comprehensive Metabolic Panel  -     TSH  -     ECG 12 Lead    Hyperlipidemia, unspecified hyperlipidemia type  -     Lipid Panel; Future  -     Lipid Panel    Factor V Leiden (CMS/[facility])      ECG 12 Lead  Date/Time: 10/25/2019 9:03 AM  Performed by: Judith Boudreaux MA  Authorized by: Savita Estrella APRN   Comparison: compared with previous ECG from 5/16/2019  Similar to previous ECG  Rhythm: sinus bradycardia  Rate: bradycardic  BPM: 53  Conduction comments: Possible septal infarct (no change from 5/2019)  ST Segments: ST segments normal  QRS axis: left (left axis deviation, low QRS voltage in precordial leads)  Other: no other findings    Clinical impression: normal ECG and non-specific ECG  Comments: No change from 5/2019        She has lost 28# in the past 2 months by making dietary changes, will check fasting labs today. Discussed possibility of sleep apnea (HR  40s at night) but will check echo/holter monitor first.   She is not currently exercising consisently.

## 2019-10-29 DIAGNOSIS — R06.83 SNORING: Primary | ICD-10-CM

## 2019-10-31 ENCOUNTER — OFFICE VISIT (OUTPATIENT)
Dept: CARDIOLOGY | Facility: CLINIC | Age: 44
End: 2019-10-31

## 2019-10-31 VITALS
BODY MASS INDEX: 29.41 KG/M2 | HEART RATE: 57 BPM | WEIGHT: 187.4 LBS | SYSTOLIC BLOOD PRESSURE: 108 MMHG | HEIGHT: 67 IN | DIASTOLIC BLOOD PRESSURE: 68 MMHG | RESPIRATION RATE: 16 BRPM

## 2019-10-31 DIAGNOSIS — R00.1 BRADYCARDIA, SINUS: Primary | ICD-10-CM

## 2019-10-31 PROCEDURE — 93000 ELECTROCARDIOGRAM COMPLETE: CPT | Performed by: INTERNAL MEDICINE

## 2019-10-31 PROCEDURE — 99203 OFFICE O/P NEW LOW 30 MIN: CPT | Performed by: INTERNAL MEDICINE

## 2019-10-31 NOTE — PROGRESS NOTES
[facility] New Patient Office Note     Encounter Date:10/31/19  Patient:Jocelyn Reyes  :1975  MRN:[id number]    Referring Provider: SARAH BETH Kumar    Consulted for: Bradycardia and fatigue    Chief Complaint:   Chief Complaint   Patient presents with   • Slow Heart Rate   • Palpitations   • Dizziness       History of Presenting Illness:      Ms. Reyes is a 43-year-old woman with past medical history notable for factor V Leiden and history of a DVT as well as obesity who presents to my office for an initial evaluation by myself regarding new bradycardia.    Patient has been doing an excellent job losing weight over the last year and has lost approximately 30 pounds through calorie restriction.  She has done this mainly by cutting out carbs.  She has not done any extreme dieting or abnormal diets.  She has slowly lost the weight appropriately.  During this time she has noticed that her heart rate has also started to decline from the mid 80s now down to the 60s and will occasionally dip into the 50s and 30s at night.  Associated with her decline in the heart rate has also been symptoms of dizziness and fatigue.  She had concerns as her apple watch was reading very low heart rates at night and thus saw her primary care physician who appropriately ordered further evaluation with thyroid levels as well as an echocardiogram and Holter monitor.  Fortunately these testing came back normal without any significant abnormalities.  Her average heart rate on her Holter was 61 bpm with the slowest heart rate at 31 bpm at 5 AM in the morning likely while sleeping.  There is an appropriate heart rate range on the Holter monitor.  She does have plans to be seen by a sleep specialist for a sleep study to evaluate for sleep apnea.  Unfortunately patient's symptoms have persisted despite having normal cardiac testing and she presents to my office for further evaluation.  She denies any significant low blood  pressures with these issues.  She has had some presyncopal spells when standing too quickly but denies any syncopal episodes.      Review of Systems:  Review of Systems   Constitution: Positive for malaise/fatigue and weight loss.   HENT: Positive for hearing loss.    Eyes: Negative.    Cardiovascular: Positive for dyspnea on exertion.   Respiratory: Negative.    Endocrine: Positive for cold intolerance and polyuria.   Skin: Negative.    Musculoskeletal: Positive for joint pain and muscle cramps.   Gastrointestinal: Positive for nausea.   Genitourinary: Negative.    Neurological: Positive for excessive daytime sleepiness, dizziness, headaches, light-headedness, numbness and paresthesias.   Psychiatric/Behavioral: Negative.    Allergic/Immunologic: Negative.        Current Outpatient Medications on File Prior to Visit   Medication Sig Dispense Refill   • cabergoline (DOSTINEX) 0.5 MG tablet Take 0.25 mg by mouth 2 (Two) Times a Week.     • fexofenadine (ALLEGRA) 180 MG tablet Take 180 mg by mouth as needed.     • IBUPROFEN PO Take  by mouth.     • montelukast (SINGULAIR) 10 MG tablet Take 1 tablet by mouth Every Night. 30 tablet 3   • omeprazole OTC (PriLOSEC OTC) 20 MG EC tablet Take 1 tablet by mouth Daily. 30 tablet 3   • [DISCONTINUED] albuterol sulfate HFA (VENTOLIN HFA) 108 (90 Base) MCG/ACT inhaler Inhale 2 puffs Every 4 (Four) Hours As Needed for Wheezing or Shortness of Air. 18 g 0   • [DISCONTINUED] cabergoline (DOSTINEX) 0.5 MG tablet Take 0.5 tablets by mouth twice weekly 8 tablet 0     No current facility-administered medications on file prior to visit.        No Known Allergies    Past Medical History:   Diagnosis Date   • DVT (deep venous thrombosis) (CMS/[facility])     2010   • Factor 5 Leiden mutation, heterozygous (CMS/[facility])        Past Surgical History:   Procedure Laterality Date   • BREAST SURGERY Bilateral 2009    breast reduction   • COLONOSCOPY N/A 7/25/2016    Procedure: COLONOSCOPY to cecum / TI;   "Surgeon: Ulisses Yousif MD;  Location: [facility] ENDOSCOPY;  Service:    • COLONOSCOPY W/ POLYPECTOMY  2011   • ENDOMETRIAL ABLATION  2011   • ENDOSCOPY N/A 7/25/2016    Procedure: ESOPHAGOGASTRODUODENOSCOPY;  Surgeon: Ulisses Yousif MD;  Location: [facility] ENDOSCOPY;  Service:    • LAPAROSCOPIC TUBAL LIGATION  2011   • TONSILLECTOMY     • TUBAL ABDOMINAL LIGATION  2011       Social History     Socioeconomic History   • Marital status:      Spouse name: Not on file   • Number of children: Not on file   • Years of education: Not on file   • Highest education level: Not on file   Tobacco Use   • Smoking status: Never Smoker   • Smokeless tobacco: Never Used   • Tobacco comment: caffeine use    Substance and Sexual Activity   • Alcohol use: Yes     Comment: occasionally   • Drug use: No       Family History   Problem Relation Age of Onset   • Breast cancer Maternal Aunt    • Uterine cancer Maternal Aunt    • Pancreatic cancer Maternal Uncle    • Colon cancer Paternal Uncle    • Thyroid cancer Maternal Grandmother    • Lung cancer Maternal Grandfather    • Coronary artery disease Mother    • Hypertension Mother    • Ulcers Father         bleeding   • Heart attack Paternal Grandmother 15       The following portions of the patient's history were reviewed and updated as appropriate: allergies, current medications, past family history, past medical history, past social history, past surgical history and problem list.       Objective:       Vitals:    10/31/19 1253 10/31/19 1259   BP: 100/60 108/68   BP Location: Right arm Left arm   Patient Position: Sitting Sitting   Pulse: 57    Resp: 16    Weight: 85 kg (187 lb 6.4 oz)    Height: 170.2 cm (67\")        Physical Exam:  Constitutional: Well appearing, well developed, no acute distress   HENT: Oropharynx clear and membrane moist  Eyes: Normal conjunctiva, no sclera icterus.  Neck: Supple, no carotid bruit bilaterally.  Cardiovascular: Regular rate and rhythm, No Murmur, " 1+ bilateral lower extremity edema.  Pulmonary: Normal respiratory effort, normal lung sounds, no wheezing.  Abdominal: Soft, nontender, no hepatosplenomegaly, liver is non-pulsatile.  Neurological: Alert and orient x 3.   Skin: Warm, dry, no ecchymosis, no rash.  Psych: Appropriate mood and affect. Normal judgment and insight.      Lab Results   Component Value Date    GLUCOSE 99 10/25/2019    BUN 11 10/25/2019    CREATININE 0.79 10/25/2019    EGFRIFNONA 79 10/25/2019    BCR 13.9 10/25/2019    K 3.6 10/25/2019    CO2 25.4 10/25/2019    CALCIUM 9.4 10/25/2019    ALBUMIN 4.80 10/25/2019    AST 19 10/25/2019    ALT 18 10/25/2019       Lab Results   Component Value Date    WBC 6.07 10/25/2019    HGB 15.0 10/25/2019    HCT 44.1 10/25/2019    MCV 90.4 10/25/2019     10/25/2019       Lab Results   Component Value Date    TROPONINT <0.010 05/16/2019       Lab Results   Component Value Date    CHOL 217 (H) 10/25/2019    CHOL 180 08/27/2018     Lab Results   Component Value Date    TRIG 96 10/25/2019    TRIG 66 08/27/2018     Lab Results   Component Value Date    HDL 48 10/25/2019    HDL 53 08/27/2018     Lab Results   Component Value Date     (H) 10/25/2019     (H) 08/27/2018       Lab Results   Component Value Date    TSH 1.620 10/25/2019         ECG 12 Lead  Date/Time: 10/31/2019 3:24 PM  Performed by: Ulisses Orlando MD  Authorized by: Ulisses Orlando MD   Comparison: compared with previous ECG from 10/25/2019  Rhythm: sinus rhythm  BPM: 57  Q waves: V1 and V2    QRS axis: left  Other findings: low voltage    Clinical impression: abnormal EKG          Holter monitor 10/26/2019:  · Average HR: 61. Min HR: 36. Max HR: 111.  · The predominant rhythm noted during the testing period was sinus rhythm.   · Premature atrial contractions occured rarely.   · Premature ventricular contractions occured rarely.   · Sinoatrial node conduction was normal.   · No atrioventricular block noted.    Echocardiogram  10/25/2019:  · Left ventricular systolic function is normal. Calculated EF = 65%. Estimated EF was in agreement with the calculated EF. Normal left ventricular cavity size and wall thickness noted. All left ventricular wall segments contract normally. Left ventricular diastolic function is normal.    CTA chest 5/16/2019:  1. There is no evidence for pulmonary thromboemboli.        Assessment:          Diagnosis Plan   1. Bradycardia, sinus            Plan:       Ms. Reyes is a 43-year-old woman with past medical history notable for factor V Leiden and history of a DVT as well as obesity who presents to my office for an initial evaluation by myself regarding new bradycardia.  Overall her bradycardia is benign on current cardiac testing.  Her Holter monitor does demonstrate some bradycardia but in general her average heart rate is 61 bpm and bradycardia is mainly noted at night during times of sleeping and probably high vagal tone.  There is been no evidence of any bradycardia arrhythmias and her symptoms are not consistent with concerns for bradycardia arrhythmias as there is no presyncope or syncopal episodes.  I do not think a longer-term monitor would provide much more information.  Additionally I do not think stress testing would provide much information either as she does have a normal heart rate range on her Holter monitor and this does not need to be demonstrated on a stress test.  Her echocardiogram is normal.  In general I think a lot of her issues with fatigue probably stem from some amount of deconditioning.  I did talk to her extensively about increasing her activity level as a lot of her weight that she is lost has been probably some muscle mass if she has not complemented her calorie restriction with exercise as well.  That being said I also agree with her primary care's physician decision to refer her for a sleep study as a lot of her symptoms could be related to undiagnosed obstructive sleep apnea.  I  would first recommend following up on her sleep study results and initiation of exercise to increase her conditioning.  I did explain to her that if she is still having issues I be happy to see her back again and we could consider further testing at that point but would first pursue her sleep study and introducing exercise to her weight loss regimen.    Bradycardia:  · Work-up thus far has only demonstrated sinus bradycardia at appropriate times with a normal heart rate range and average heart rate.  · Agree with further work-up for obstructive sleep apnea to look for other causes of bradycardia  · I have recommended that she increase her activity level to help with some of her deconditioning  If sleep study normal and patient's symptoms do not improve with increasing activity level would be happy to see her back and consider further testing at that time.      Follow-up:  I would keep our follow-up open-ended.  Pursuing sleep study and increasing exercise would be my first recommendation and we can see how her symptoms progress with these testing and changes.  If she continues to have significant fatigue despite either negative sleep study or appropriate treatment of sleep apnea we could consider further testing at that time but I do not see stress testing at this point to be extremely helpful to delineate her symptoms.    Thank you for allowing me to participate in the care of Jocelyn Reyes. Feel free to contact me directly with any further questions or concerns.    Ulisses Orlando MD  [facility]  10/31/19  3:27 PM

## 2019-11-08 ENCOUNTER — OFFICE VISIT (OUTPATIENT)
Dept: INTERNAL MEDICINE | Facility: CLINIC | Age: 44
End: 2019-11-08

## 2019-11-08 VITALS
HEART RATE: 50 BPM | WEIGHT: 185 LBS | HEIGHT: 67 IN | DIASTOLIC BLOOD PRESSURE: 78 MMHG | BODY MASS INDEX: 29.03 KG/M2 | SYSTOLIC BLOOD PRESSURE: 130 MMHG | OXYGEN SATURATION: 98 %

## 2019-11-08 DIAGNOSIS — R00.1 SINUS BRADYCARDIA: ICD-10-CM

## 2019-11-08 DIAGNOSIS — R53.81 MALAISE AND FATIGUE: ICD-10-CM

## 2019-11-08 DIAGNOSIS — R00.2 PALPITATIONS: Primary | ICD-10-CM

## 2019-11-08 DIAGNOSIS — R53.83 MALAISE AND FATIGUE: ICD-10-CM

## 2019-11-08 PROCEDURE — 99214 OFFICE O/P EST MOD 30 MIN: CPT | Performed by: NURSE PRACTITIONER

## 2019-11-10 NOTE — PROGRESS NOTES
Subjective   Jocelyn Reyes is a 43 y.o. female who presents for f/u regarding fatigue with intermittent lightheadedness/palpitations.    She continues to c/o fatigue throughout the day, echo did not show any acute abnl. Holter monitor showed sinus bradycardia. She has been referred for a sleep study to further evaluate.      Palpitations    This is a recurrent problem. The current episode started 1 to 4 weeks ago. The problem occurs intermittently. The problem has been waxing and waning. Associated symptoms include dizziness (worse with bending forward), an irregular heartbeat and malaise/fatigue. Pertinent negatives include no chest pain, coughing, fever, nausea, shortness of breath, vomiting or weakness. She has tried nothing for the symptoms. There is no history of heart disease or a valve disorder.        The following portions of the patient's history were reviewed and updated as appropriate: allergies, current medications, past social history and problem list.    Past Medical History:   Diagnosis Date   • DVT (deep venous thrombosis) (CMS/[facility])     2010   • Factor 5 Leiden mutation, heterozygous (CMS/[facility])          Current Outpatient Medications:   •  cabergoline (DOSTINEX) 0.5 MG tablet, Take 0.25 mg by mouth 2 (Two) Times a Week., Disp: , Rfl:   •  fexofenadine (ALLEGRA) 180 MG tablet, Take 180 mg by mouth as needed., Disp: , Rfl:   •  IBUPROFEN PO, Take  by mouth., Disp: , Rfl:   •  montelukast (SINGULAIR) 10 MG tablet, Take 1 tablet by mouth Every Night., Disp: 30 tablet, Rfl: 3  •  omeprazole OTC (PriLOSEC OTC) 20 MG EC tablet, Take 1 tablet by mouth Daily., Disp: 30 tablet, Rfl: 3    No Known Allergies    Review of Systems   Constitutional: Positive for fatigue and malaise/fatigue. Negative for chills, fever and unexpected weight change.   HENT: Negative for congestion, ear pain, postnasal drip, sinus pressure, sore throat and trouble swallowing.    Eyes: Negative for visual disturbance.   Respiratory:  "Negative for cough, chest tightness, shortness of breath and wheezing.    Cardiovascular: Positive for palpitations. Negative for chest pain and leg swelling.   Gastrointestinal: Negative for abdominal pain, blood in stool, nausea and vomiting.   Genitourinary: Negative for dysuria, frequency and urgency.   Musculoskeletal: Negative for arthralgias and joint swelling.   Skin: Negative for color change.   Neurological: Positive for dizziness (worse with bending forward). Negative for syncope, weakness and headaches.   Hematological: Does not bruise/bleed easily.       Objective   Vitals:    11/08/19 1039   BP: 130/78   BP Location: Left arm   Patient Position: Sitting   Cuff Size: Adult   Pulse: 50   SpO2: 98%   Weight: 83.9 kg (185 lb)   Height: 170.2 cm (67\")     Body mass index is 28.98 kg/m².  Physical Exam   Constitutional: She appears well-developed and well-nourished. She is cooperative. She does not have a sickly appearance. She does not appear ill.   HENT:   Head: Normocephalic.   Right Ear: Hearing, tympanic membrane and external ear normal.   Left Ear: Hearing, tympanic membrane and external ear normal.   Nose: Nose normal. No mucosal edema, rhinorrhea, sinus tenderness or nasal deformity. Right sinus exhibits no maxillary sinus tenderness and no frontal sinus tenderness. Left sinus exhibits no maxillary sinus tenderness and no frontal sinus tenderness.   Mouth/Throat: Oropharynx is clear and moist and mucous membranes are normal. Normal dentition.   Eyes: Conjunctivae and lids are normal. Right eye exhibits no discharge and no exudate. Left eye exhibits no discharge and no exudate.   Neck: Trachea normal. Carotid bruit is not present. No edema present. No thyroid mass present.   Cardiovascular: Regular rhythm, normal heart sounds and normal pulses.   No murmur heard.  Pulmonary/Chest: Breath sounds normal. No respiratory distress. She has no decreased breath sounds. She has no wheezes. She has no rhonchi. " She has no rales.   Lymphadenopathy:        Head (right side): No submental, no submandibular, no tonsillar, no preauricular, no posterior auricular and no occipital adenopathy present.        Head (left side): No submental, no submandibular, no tonsillar, no preauricular, no posterior auricular and no occipital adenopathy present.   Neurological: She is alert.   Skin: Skin is warm, dry and intact. No cyanosis. Nails show no clubbing.       Assessment/Plan   Jocelyn was seen today for follow-up.    Diagnoses and all orders for this visit:    Palpitations    Sinus bradycardia    Malaise and fatigue    Reviewed results of echo and holter monitor with patient, no acute abnl noted. Sinus bradycardia noted but otherwise nothing to explain her lingering fatigue. She will proceed with the sleep study consult and study to further evaluate.  She has been working long hours for the past year which may be a contributing factor, discussed.

## 2019-11-11 ENCOUNTER — HOSPITAL ENCOUNTER (OUTPATIENT)
Dept: PHYSICAL THERAPY | Facility: HOSPITAL | Age: 44
Setting detail: THERAPIES SERIES
Discharge: HOME | End: 2019-11-11

## 2019-11-11 DIAGNOSIS — M79.601 RIGHT ARM PAIN: Primary | ICD-10-CM

## 2019-11-11 DIAGNOSIS — G89.29 CHRONIC BILATERAL LOW BACK PAIN WITHOUT SCIATICA: ICD-10-CM

## 2019-11-11 DIAGNOSIS — M54.50 CHRONIC BILATERAL LOW BACK PAIN WITHOUT SCIATICA: ICD-10-CM

## 2019-11-11 DIAGNOSIS — M54.50 ACUTE BILATERAL LOW BACK PAIN WITHOUT SCIATICA: Primary | ICD-10-CM

## 2019-11-11 DIAGNOSIS — M25.511 CHRONIC RIGHT SHOULDER PAIN: ICD-10-CM

## 2019-11-11 DIAGNOSIS — M25.511 ACUTE PAIN OF RIGHT SHOULDER: Primary | ICD-10-CM

## 2019-11-11 DIAGNOSIS — G89.29 CHRONIC RIGHT SHOULDER PAIN: ICD-10-CM

## 2019-11-11 PROCEDURE — 97110 THERAPEUTIC EXERCISES: CPT

## 2019-11-11 PROCEDURE — 97162 PT EVAL MOD COMPLEX 30 MIN: CPT
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
## 2019-11-14 RX ORDER — CABERGOLINE 0.5 MG/1
TABLET ORAL
Qty: 16 TABLET | Refills: 0 | Status: CANCELLED | OUTPATIENT
Start: 2019-11-14

## 2019-11-15 RX ORDER — CABERGOLINE 0.5 MG/1
TABLET ORAL
Qty: 16 TABLET | Refills: 0 | Status: CANCELLED | OUTPATIENT
Start: 2019-11-14

## 2019-11-18 ENCOUNTER — HOSPITAL ENCOUNTER (OUTPATIENT)
Dept: PHYSICAL THERAPY | Facility: HOSPITAL | Age: 44
Setting detail: THERAPIES SERIES
Discharge: HOME | End: 2019-11-18

## 2019-11-18 DIAGNOSIS — G89.29 CHRONIC BILATERAL LOW BACK PAIN WITHOUT SCIATICA: ICD-10-CM

## 2019-11-18 DIAGNOSIS — M54.50 CHRONIC BILATERAL LOW BACK PAIN WITHOUT SCIATICA: ICD-10-CM

## 2019-11-18 DIAGNOSIS — M25.511 CHRONIC RIGHT SHOULDER PAIN: ICD-10-CM

## 2019-11-18 DIAGNOSIS — M79.601 RIGHT ARM PAIN: Primary | ICD-10-CM

## 2019-11-18 DIAGNOSIS — G89.29 CHRONIC RIGHT SHOULDER PAIN: ICD-10-CM

## 2019-11-18 PROCEDURE — 97110 THERAPEUTIC EXERCISES: CPT | Performed by: PHYSICAL THERAPIST

## 2019-11-18 PROCEDURE — 97140 MANUAL THERAPY 1/> REGIONS: CPT | Performed by: PHYSICAL THERAPIST

## 2019-11-18 NOTE — THERAPY TREATMENT NOTE
"    Outpatient Physical Therapy Ortho Treatment Note  [facility]     Patient Name: Jocelyn Reyes  : 1975  MRN: [id number]  Today's Date: 2019      Visit Date: 2019    Visit Dx:    ICD-10-CM ICD-9-CM   1. Right arm pain M79.601 729.5   2. Chronic right shoulder pain M25.511 719.41    G89.29 338.29   3. Chronic bilateral low back pain without sciatica M54.5 724.2    G89.29 338.29       Patient Active Problem List   Diagnosis   • Apnea, sleep   • Factor V Leiden (CMS/[facility])   • History of DVT (deep vein thrombosis)        Past Medical History:   Diagnosis Date   • DVT (deep venous thrombosis) (CMS/[facility])        • Factor 5 Leiden mutation, heterozygous (CMS/[facility])         Past Surgical History:   Procedure Laterality Date   • BREAST SURGERY Bilateral 2009    breast reduction   • COLONOSCOPY N/A 2016    Procedure: COLONOSCOPY to cecum / TI;  Surgeon: Ulisses Yousif MD;  Location: [facility] ENDOSCOPY;  Service:    • COLONOSCOPY W/ POLYPECTOMY     • ENDOMETRIAL ABLATION     • ENDOSCOPY N/A 2016    Procedure: ESOPHAGOGASTRODUODENOSCOPY;  Surgeon: Ulisses Yousif MD;  Location: [facility] ENDOSCOPY;  Service:    • LAPAROSCOPIC TUBAL LIGATION     • TONSILLECTOMY     • TUBAL ABDOMINAL LIGATION                         PT Assessment/Plan     Row Name 19 1717          PT Assessment    Assessment Comments  First session since IE. No change in R pectoral/shoulder symptoms. Increased pain in lumbar spine secondary to yard work. Manual therapy to address \"TOS\" symptoms. SIgnificant tightness/restrictions noted in R cervical/scapular tissue compared to L. Added new stretches to address. Reproduction of symptoms with stretching of R upper trap with shoulder depression. Added basic lumbar stretches to HEP to address discomfort. Suggested use of ice to R side at end of work day to control further irritation.   -CJ        PT Plan    PT Plan Comments  assess response to last session. Continue " with current program (manual followed by strengthening). Add T spine mobs  -CJ       User Key  (r) = Recorded By, (t) = Taken By, (c) = Cosigned By    Initials Name Provider Type    CJ Abdelrahman Walsh, PT Physical Therapist            OP Exercises     Row Name 11/18/19 1700 11/18/19 1600          Subjective Comments    Subjective Comments  --  Shoulder/chest is the same and now the low back is irritated from doing a little yard work.   -CJ        Subjective Pain    Able to rate subjective pain?  --  yes  -     Pre-Treatment Pain Level  --  8  -CJ        Total Minutes    17842 - PT Therapeutic Exercise Minutes  --  20  -CJ     32777 - PT Manual Therapy Minutes  20  -CJ  --        Exercise 1    Exercise Name 1  --  S/L thoracic rotation  -CJ     Reps 1  --  4 B  -CJ     Time 1  --  10 sec  -CJ        Exercise 2    Exercise Name 2  --  posterior shoulder rolls  -CJ     Sets 2  --  2  -CJ     Reps 2  --  15  -CJ     Additional Comments  --  #2  -CJ        Exercise 3    Exercise Name 3  --  doorway stretch  -CJ     Reps 3  --  2  -CJ     Time 3  --  30sec  -CJ     Additional Comments  --  upside down V  -CJ        Exercise 4    Exercise Name 4  --  Thoracic noodle stretch with alt arms  -CJ     Reps 4  --  15  -CJ     Additional Comments  --  blue noodle; 10 deep breaths out/in  -CJ        Exercise 5    Exercise Name 5  --  Shldr Ext  -CJ     Reps 5  --  15  -CJ     Additional Comments  --  RTB *posture  -CJ        Exercise 6    Exercise Name 6  --  Rows  -CJ     Reps 6  --  15  -CJ     Additional Comments  --  RTB * posture  -CJ        Exercise 7    Exercise Name 7  --  SKTC stretch  -CJ     Additional Comments  --  demonstrated  -CJ        Exercise 8    Exercise Name 8  --  Piriformis stretch  -CJ     Reps 8  --  2  -CJ     Time 8  --  30sec  -CJ        Exercise 9    Exercise Name 9  --  Supine 90/90 HS stretch  -CJ     Reps 9  --  2  -CJ     Time 9  --  30 sec  -CJ       User Key  (r) = Recorded By, (t) = Taken By,  (c) = Cosigned By    Initials Name Provider Type    Abdelrahman Garcia, PT Physical Therapist                      Manual Rx (last 36 hours)      Manual Treatments     Row Name 11/18/19 1700             Total Minutes    02791 - PT Manual Therapy Minutes  20  -         Manual Rx 1    Manual Rx 1 Location  Prone R scapular work; intrascapular tissues and cervical tissues  -      Manual Rx 1 Type  Then supine for R side cervical work   -      Manual Rx 1 Grade  Passive stretching to B upper traps, B levators  -      Manual Rx 1 Duration  Overpressure to B shoulders in inferior direction with first rib mobs on R  -        User Key  (r) = Recorded By, (t) = Taken By, (c) = Cosigned By    Initials Name Provider Type    Abdelrahman Garcia, PT Physical Therapist          PT OP Goals     Row Name 11/18/19 1700          PT Short Term Goals    STG Date to Achieve  11/25/19  -     STG 1  Pt will demonstrate understanding and compliance with initial HEP.  -     STG 1 Progress  Ongoing  -     STG 2  Pt will report 0 occurences of waking from sleep to R pectoral region pain.  -     STG 2 Progress  Ongoing  -     STG 3  Pt will tolerate shoulder row exercise without inc R sided neck or anterior chest pain.   -     STG 3 Progress  Ongoing  -        Long Term Goals    LTG Date to Achieve  12/11/19  -     LTG 1  Pt will demonstrate improved R UE AROM to 150 deg or better flexion/abduction without inc R shoulder symptoms.  -     LTG 1 Progress  Ongoing  -     LTG 2  Pt will demonstrate improved RUE strength to 4/5 or better flexion, abduction, ER without inc R shoulder symptoms.  -     LTG 2 Progress  Ongoing  -     LTG 3  Pt will report pain at worse reduced from 8/10 to 5/10 or better in R shoulder.  -     LTG 3 Progress  Ongoing  -       User Key  (r) = Recorded By, (t) = Taken By, (c) = Cosigned By    Initials Name Provider Type    Abdelrahman Garcia, PT Physical Therapist          Therapy  Education  Given: HEP, Symptoms/condition management  Program: New  How Provided: Verbal, Written, Demonstration  Provided to: Patient  Level of Understanding: Teach back education performed, Verbalized              Time Calculation:   Start Time: 1615  Stop Time: 1700  Time Calculation (min): 45 min  Total Timed Code Minutes- PT: 45 minute(s)  Therapy Charges for Today     Code Description Service Date Service Provider Modifiers Qty    23155728803  PT THER PROC EA 15 MIN 11/18/2019 Abdelrahman Walsh, PT GP 2    [id number]  PT MANUAL THERAPY EA 15 MIN 11/18/2019 Abdelrahman Walsh, PT GP 1                    Abdelrahman Walsh, PT  11/18/2019

## 2019-11-27 ENCOUNTER — HOSPITAL ENCOUNTER (OUTPATIENT)
Dept: PHYSICAL THERAPY | Facility: HOSPITAL | Age: 44
Setting detail: THERAPIES SERIES
Discharge: HOME | End: 2019-11-27

## 2019-11-27 DIAGNOSIS — G89.29 CHRONIC BILATERAL LOW BACK PAIN WITHOUT SCIATICA: ICD-10-CM

## 2019-11-27 DIAGNOSIS — M54.50 CHRONIC BILATERAL LOW BACK PAIN WITHOUT SCIATICA: ICD-10-CM

## 2019-11-27 DIAGNOSIS — M25.511 CHRONIC RIGHT SHOULDER PAIN: ICD-10-CM

## 2019-11-27 DIAGNOSIS — G89.29 CHRONIC RIGHT SHOULDER PAIN: ICD-10-CM

## 2019-11-27 DIAGNOSIS — M79.601 RIGHT ARM PAIN: Primary | ICD-10-CM

## 2019-11-27 PROCEDURE — 97140 MANUAL THERAPY 1/> REGIONS: CPT | Performed by: PHYSICAL THERAPIST

## 2019-11-27 PROCEDURE — 97110 THERAPEUTIC EXERCISES: CPT | Performed by: PHYSICAL THERAPIST

## 2019-11-27 NOTE — THERAPY TREATMENT NOTE
Outpatient Physical Therapy Ortho Treatment Note  [facility]     Patient Name: Jocelyn Reyes  : 1975  MRN: [id number]  Today's Date: 2019      Visit Date: 2019    Visit Dx:    ICD-10-CM ICD-9-CM   1. Right arm pain M79.601 729.5   2. Chronic right shoulder pain M25.511 719.41    G89.29 338.29   3. Chronic bilateral low back pain without sciatica M54.5 724.2    G89.29 338.29       Patient Active Problem List   Diagnosis   • Apnea, sleep   • Factor V Leiden (CMS/HCC)   • History of DVT (deep vein thrombosis)        Past Medical History:   Diagnosis Date   • DVT (deep venous thrombosis) (CMS/HCC)        • Factor 5 Leiden mutation, heterozygous (CMS/HCC)         Past Surgical History:   Procedure Laterality Date   • BREAST SURGERY Bilateral 2009    breast reduction   • COLONOSCOPY N/A 2016    Procedure: COLONOSCOPY to cecum / TI;  Surgeon: Ulisses Yousif MD;  Location: [facility] ENDOSCOPY;  Service:    • COLONOSCOPY W/ POLYPECTOMY     • ENDOMETRIAL ABLATION     • ENDOSCOPY N/A 2016    Procedure: ESOPHAGOGASTRODUODENOSCOPY;  Surgeon: Ulisses Yousif MD;  Location: [facility] ENDOSCOPY;  Service:    • LAPAROSCOPIC TUBAL LIGATION     • TONSILLECTOMY     • TUBAL ABDOMINAL LIGATION                         PT Assessment/Plan     Row Name 19 1712          PT Assessment    Assessment Comments  Mrs. Reyes reporting a reduction in R shoulder/pectoral symptoms after manual therapy last session. Reports compliance with HEP to date. Still requires verbal and tactile cueing for improved posture and technique with scapular exercises. Added prone scapular work and she demonstrated significant difficulty with Y, indicating lower trap weakness.  Continued with manual therapy work and again able to reproduce symptoms with passive upper trap stretch and shoulder depression. Ice given for her to take back to work with her.   -CJ       User Key  (r) = Recorded By, (t) = Taken By, (c)  = Cosigned By    Initials Name Provider Type    Abdelrahman Garcia S, PT Physical Therapist            OP Exercises     Row Name 11/27/19 1700             Subjective Comments    Subjective Comments  shoulder/chest felt much better after last session and did not return symptoms until yesterday. I started having low back spasms after picking up my dog the other day so I need to know what to do about that.   -CJ         Subjective Pain    Able to rate subjective pain?  yes  -CJ      Pre-Treatment Pain Level  5  -CJ      Post-Treatment Pain Level  3  -CJ         Total Minutes    70836 - PT Therapeutic Exercise Minutes  30  -CJ      80390 - PT Manual Therapy Minutes  15  -CJ         Exercise 1    Exercise Name 1  S/L thoracic rotation  -CJ      Reps 1  4 B  -CJ      Time 1  10 sec  -CJ         Exercise 2    Exercise Name 2  posterior shoulder rolls  -CJ      Sets 2  2  -CJ      Reps 2  15  -CJ         Exercise 3    Exercise Name 3  doorway stretch  -CJ      Reps 3  2  -CJ      Time 3  30sec  -CJ         Exercise 5    Exercise Name 5  Shldr Ext  -CJ      Reps 5  15  -CJ      Time 5  3 sec hold  -CJ      Additional Comments  GTB * watch posture  -CJ         Exercise 6    Exercise Name 6  Rows  -CJ      Reps 6  15  -CJ      Time 6  3 sec hold, GTB  -CJ      Additional Comments  Watch upper trap compensation  -CJ         Exercise 7    Exercise Name 7  Serratus punches, #3  -CJ      Reps 7  20  -CJ      Additional Comments  over towel roll  -CJ         Exercise 8    Exercise Name 8  Prone Ys, T, Ws  -CJ      Reps 8  10 eac  -CJ         Exercise 9    Exercise Name 9  B shldr ER, noodle behind back on wall  -CJ      Reps 9  15  -CJ      Additional Comments  RTB  -CJ         Exercise 10    Exercise Name 10  Bicep curl, B- noodle behind back against wall  -CJ      Reps 10  15  -CJ      Additional Comments  #3  -CJ        User Key  (r) = Recorded By, (t) = Taken By, (c) = Cosigned By    Initials Name Provider Type    Abdelrahman Garcia  S, PT Physical Therapist                      Manual Rx (last 36 hours)      Manual Treatments     Row Name 11/27/19 1700             Total Minutes    67629 - PT Manual Therapy Minutes  15  -         Manual Rx 1    Manual Rx 1 Location  Prone R scapular work; intrascapular tissues and cervical tissues  -      Manual Rx 1 Type  Then supine for R side cervical work   -      Manual Rx 1 Grade  Passive stretching to B upper traps, B levators  -      Manual Rx 1 Duration  Overpressure to B shoulders in inferior direction with first rib mobs on R  -         Manual Rx 2    Manual Rx 2 Location  gentle PA/cervical lat glides  -        User Key  (r) = Recorded By, (t) = Taken By, (c) = Cosigned By    Initials Name Provider Type     Abdelrahman Walsh, PT Physical Therapist                             Time Calculation:   Start Time: 1615  Stop Time: 1700  Time Calculation (min): 45 min  Total Timed Code Minutes- PT: 45 minute(s)  Therapy Charges for Today     Code Description Service Date Service Provider Modifiers Qty    70466677479  PT THER PROC EA 15 MIN 11/27/2019 Abdelrahman Walsh, PT GP 2    [id number]  PT MANUAL THERAPY EA 15 MIN 11/27/2019 Abdelrahman Walsh, PT GP 1                    Abdelrahman Walsh PT  11/27/2019

## 2019-12-04 ENCOUNTER — LAB (OUTPATIENT)
Dept: LAB | Facility: HOSPITAL | Age: 44
End: 2019-12-04

## 2019-12-04 ENCOUNTER — HOSPITAL ENCOUNTER (OUTPATIENT)
Dept: PHYSICAL THERAPY | Facility: HOSPITAL | Age: 44
Setting detail: THERAPIES SERIES
Discharge: HOME | End: 2019-12-04

## 2019-12-04 ENCOUNTER — OFFICE VISIT (OUTPATIENT)
Dept: SLEEP MEDICINE | Facility: HOSPITAL | Age: 44
End: 2019-12-04

## 2019-12-04 ENCOUNTER — TRANSCRIBE ORDERS (OUTPATIENT)
Dept: ADMINISTRATIVE | Facility: HOSPITAL | Age: 44
End: 2019-12-04

## 2019-12-04 VITALS
OXYGEN SATURATION: 98 % | SYSTOLIC BLOOD PRESSURE: 143 MMHG | HEIGHT: 67 IN | DIASTOLIC BLOOD PRESSURE: 76 MMHG | WEIGHT: 192 LBS | BODY MASS INDEX: 30.13 KG/M2 | HEART RATE: 56 BPM

## 2019-12-04 DIAGNOSIS — G89.29 CHRONIC BILATERAL LOW BACK PAIN WITHOUT SCIATICA: ICD-10-CM

## 2019-12-04 DIAGNOSIS — E22.1 HYPERPROLACTINEMIA (HCC): Primary | ICD-10-CM

## 2019-12-04 DIAGNOSIS — E22.1 HYPERPROLACTINEMIA (HCC): ICD-10-CM

## 2019-12-04 DIAGNOSIS — M25.511 CHRONIC RIGHT SHOULDER PAIN: ICD-10-CM

## 2019-12-04 DIAGNOSIS — G89.29 CHRONIC RIGHT SHOULDER PAIN: ICD-10-CM

## 2019-12-04 DIAGNOSIS — G47.8 NON-RESTORATIVE SLEEP: ICD-10-CM

## 2019-12-04 DIAGNOSIS — M54.50 CHRONIC BILATERAL LOW BACK PAIN WITHOUT SCIATICA: ICD-10-CM

## 2019-12-04 DIAGNOSIS — M79.601 RIGHT ARM PAIN: Primary | ICD-10-CM

## 2019-12-04 DIAGNOSIS — G47.10 HYPERSOMNIA: Primary | ICD-10-CM

## 2019-12-04 DIAGNOSIS — R06.83 SNORING: ICD-10-CM

## 2019-12-04 LAB — PROLACTIN SERPL-MCNC: 15.1 NG/ML (ref 4.79–23.3)

## 2019-12-04 PROCEDURE — G0463 HOSPITAL OUTPT CLINIC VISIT: HCPCS

## 2019-12-04 PROCEDURE — 36415 COLL VENOUS BLD VENIPUNCTURE: CPT

## 2019-12-04 PROCEDURE — 97140 MANUAL THERAPY 1/> REGIONS: CPT | Performed by: PHYSICAL THERAPIST

## 2019-12-04 PROCEDURE — 97110 THERAPEUTIC EXERCISES: CPT | Performed by: PHYSICAL THERAPIST

## 2019-12-04 PROCEDURE — 99213 OFFICE O/P EST LOW 20 MIN: CPT | Performed by: FAMILY MEDICINE

## 2019-12-04 PROCEDURE — 84146 ASSAY OF PROLACTIN: CPT

## 2019-12-04 NOTE — THERAPY TREATMENT NOTE
Outpatient Physical Therapy Ortho Treatment Note  [facility]     Patient Name: Jocelyn Reyes  : 1975  MRN: [id number]  Today's Date: 2019      Visit Date: 2019    Visit Dx:    ICD-10-CM ICD-9-CM   1. Right arm pain M79.601 729.5   2. Chronic right shoulder pain M25.511 719.41    G89.29 338.29   3. Chronic bilateral low back pain without sciatica M54.5 724.2    G89.29 338.29       Patient Active Problem List   Diagnosis   • Apnea, sleep   • Factor V Leiden (CMS/HCC)   • History of DVT (deep vein thrombosis)        Past Medical History:   Diagnosis Date   • DVT (deep venous thrombosis) (CMS/HCC)        • Factor 5 Leiden mutation, heterozygous (CMS/HCC)         Past Surgical History:   Procedure Laterality Date   • BREAST SURGERY Bilateral 2009    breast reduction   • COLONOSCOPY N/A 2016    Procedure: COLONOSCOPY to cecum / TI;  Surgeon: Ulisses Yousif MD;  Location: [facility] ENDOSCOPY;  Service:    • COLONOSCOPY W/ POLYPECTOMY     • ENDOMETRIAL ABLATION     • ENDOSCOPY N/A 2016    Procedure: ESOPHAGOGASTRODUODENOSCOPY;  Surgeon: Ulisses Yousif MD;  Location: [facility] ENDOSCOPY;  Service:    • LAPAROSCOPIC TUBAL LIGATION     • TONSILLECTOMY     • TUBAL ABDOMINAL LIGATION                         PT Assessment/Plan     Row Name 19 1643          PT Assessment    Assessment Comments  Mrs. Reyes again reports a reduction in R pectoral symptoms for approximately 4-5 days after last session but returned on  and has increased in intensity since that time. She reports a burning sensation that hurts with taking a breath. She has fibrous breast tissue from an old reduction but demonstrated palpable tension/taut band in distal pectoral tissues. Otherwise she is performing HEP consistently at home and reports an overall 30-40% improvement to date. Educated on the chronicity of her issues that will likely take time to work backwards.   -CJ       User Key  (r) =  Recorded By, (t) = Taken By, (c) = Cosigned By    Initials Name Provider Type    Abdelrahman Garcia, PT Physical Therapist            OP Exercises     Row Name 12/04/19 1500             Subjective Comments    Subjective Comments  Overall about 40% better with both neck/shoulder and low back. The burning returned on Sat. for unknown reason  -CJ         Subjective Pain    Able to rate subjective pain?  yes  -CJ      Pre-Treatment Pain Level  8  -CJ      Post-Treatment Pain Level  6  -CJ         Total Minutes    94823 - PT Therapeutic Exercise Minutes  25  -CJ      78110 - PT Manual Therapy Minutes  20  -CJ         Exercise 2    Exercise Name 2  posterior shoulder rolls  -CJ      Reps 2  15  -CJ         Exercise 3    Exercise Name 3  doorway stretch  -CJ      Reps 3  4  -CJ      Time 3  15 sec  -CJ         Exercise 4    Exercise Name 4  Thoracic noodle stretch with alt arms  -CJ      Reps 4  15  -CJ      Additional Comments  blue noodle: 10 deep breaths prior to arm movement  -         Exercise 7    Exercise Name 7  Serratus punches, #3  -CJ      Reps 7  20  -CJ      Additional Comments  on blue noodle  -         Exercise 8    Exercise Name 8  Prone Ys, T, Ws  -CJ      Reps 8  10 eac  -CJ         Exercise 9    Exercise Name 9  B shldr ER, noodle behind back on wall  -CJ      Reps 9  15  -CJ      Additional Comments  RTB  -CJ         Exercise 10    Exercise Name 10  Bicep curl, B- noodle behind back against wall  -CJ      Reps 10  15  -CJ      Additional Comments  #3  -CJ         Exercise 11    Exercise Name 11  Supine star, RTB  -      Reps 11  10  -CJ      Additional Comments  towel roll behind back  -        User Key  (r) = Recorded By, (t) = Taken By, (c) = Cosigned By    Initials Name Provider Type    Abdelrahman Garcia, PT Physical Therapist                      Manual Rx (last 36 hours)      Manual Treatments     Row Name 12/04/19 1500             Total Minutes    77948 - PT Manual Therapy Minutes  20  -CJ          Manual Rx 1    Manual Rx 1 Location  Prone R scapular work; intrascapular tissues and cervical tissues  -      Manual Rx 1 Type  Then supine for R side cervical work   -      Manual Rx 1 Grade  Passive stretching to B upper traps, B levators  -      Manual Rx 1 Duration  Overpressure to B shoulders in inferior direction with first rib mobs on R  -         Manual Rx 2    Manual Rx 2 Location  gentle PA/cervical lat glides  -      Manual Rx 2 Type  Overpressure posterior to B shoulders, pectoral stretch  -        User Key  (r) = Recorded By, (t) = Taken By, (c) = Cosigned By    Initials Name Provider Type     Abdelrahman Walsh, PT Physical Therapist                             Time Calculation:   Start Time: 1530  Stop Time: 1615  Time Calculation (min): 45 min  Total Timed Code Minutes- PT: 45 minute(s)  Therapy Charges for Today     Code Description Service Date Service Provider Modifiers Qty    [id number]  PT THER PROC EA 15 MIN 12/4/2019 Abdelrahman Walsh, PT GP 2    [id number]  PT MANUAL THERAPY EA 15 MIN 12/4/2019 Abdelrahman Walsh, PT GP 1                    Abdelrahman Walsh PT  12/4/2019

## 2019-12-04 NOTE — PROGRESS NOTES
[facility] Patient[facility]Consultation       Reason for Consultation: Snoring nonrestorative sleep      Patient Care Team:  Savita Estrella APRN as PCP - General (Internal Medicine)  Farhan Watters MD as Consulting Physician (Sleep Medicine)      History of present illness:  Thank you for asking me to see your patient.  The patient is a 44 y.o. female presents to concern for sleep disorder due to history of snoring nonrestorative sleep hypersomnia dizziness and over the past 6-month palpitations and decreased heart rate.  She reports having a sleep study around 2016 at [facility].  I do not see a report of this.  Reports that she did not receive treatment.  Tonsillectomy 1978.     Patient seen by cardiology on October 31, 2019 due to bradycardia which is new.  Overall per the note her bradycardia is benign on current cardiac testing.  Her Holter monitor did demonstrate some bradycardia but in general her average heart rate was 61 bpm.  No bradycardia arrhythmias were noted.  Cardiologist did not think stress test to provide much information either as she does have a normal heart range on her Holter monitor echo was normal.  Note also mentioned fatigue likely stems from deconditioning; we discussed increasing her activity level as a lot of her weight that has been lost has been probably some muscle mass if she is not ambulatory caloric restriction and exercise as well.    Sleep Schedule:  Bed time: Weekdays 9 PM to 10 PM on weekends 10 PM to 11 PM  Sleep latency: Instant  Wake time: Weekdays 5:30 AM to 6 AM on weekends at 7 AM to 8 AM  Average hours slept: 7-8  Non-restorative sleep: Yes  Number of naps per day: 0  Rotating shifts?:  Yes; sometimes works 11:30 AM to 8 PM however mostly 7 AM to 3:30 PM  Nocturia: 1 time  Electronics in bedroom: Yes    Excessive daytime sleepiness or drowsiness:Y  Any accidents at work due to sleepiness in the last 5 years:N  Any difficulty driving due to sleepiness or  "being drowsy: N  Weight changed in the last 5 years:LOST 25 LBS    Snoring:Y  Witnessed apneas:N  Have you ever awakened gasping for breath, coughing, choking or respiratory discomfort: N  Morning headaches: Y  Awaken with a sore throat or dry mouth: Y    Any reports of leg jerking at night: N  Urge sensations: N  Does pain disrupt sleep: N  Sweating during sleep: N  Teeth grinding: Y    Any sudden episodes of sleep during the day: N  Sleep paralysis/hallucinations: N  Muscle weakness with laughing/anger: N  Nightmares: N  Sleep walking: N    Are you sleepy when you increase your sleep time: Y  Do you sleep better away from your own bed: N    ESS: 14    Social History: Sonographer; 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    Family History: PRANAV no       Current Outpatient Medications:   •  cabergoline (DOSTINEX) 0.5 MG tablet, Take 0.25 mg by mouth 2 (Two) Times a Week., Disp: , Rfl:   •  fexofenadine (ALLEGRA) 180 MG tablet, Take 180 mg by mouth as needed., Disp: , Rfl:   •  IBUPROFEN PO, Take  by mouth., Disp: , Rfl:   •  montelukast (SINGULAIR) 10 MG tablet, Take 1 tablet by mouth Every Night., Disp: 30 tablet, Rfl: 3  •  omeprazole OTC (PriLOSEC OTC) 20 MG EC tablet, Take 1 tablet by mouth Daily., Disp: 30 tablet, Rfl: 3    Vital Signs:    Vitals:    12/04/19 0818   BP: 143/76   Pulse: 56   SpO2: 98%   Weight: 87.1 kg (192 lb)   Height: 170.2 cm (67\")      Body mass index is 30.07 kg/m².  Neck Circumference: 14 inches      Physical Exam:   General Alert and oriented. No acute distress noted   Pharynx/Throat Class II/III Mallampati airway, large tongue, no evidence of redundant lateral pharyngeal tissue. No oral lesions. No " thrush. Moist mucous membranes.   Head Normocephalic. Symmetrical. Atraumatic.    Nose No septal deviation. No drainage   Chest Wall Normal shape. Symmetric expansion with respiration. No tenderness.   Neck Trachea midline, no thyromegaly or adenopathy    Lungs Clear to auscultation bilaterally. No wheezes. No rhonchi. No rales. Respirations regular, even and unlabored.   Heart Regular rhythm and normal rate. Normal S1 and S2. No murmur   Abdomen Soft, non-tender and non-distended. Normal bowel sounds. No masses.   Extremities Moves all extremities well. No edema   Psychiatric Normal mood and affect.       Impression:  1. Hypersomnia    2. Snoring    3. Non-restorative sleep        Plan:    Good sleep hygiene measures should be maintained.  Weight loss would be beneficial in this patient who is obese with BMI 30.1.    I discussed the pathophysiology of obstructive sleep apnea with the patient.  We discussed the adverse outcomes associated with untreated sleep-disordered breathing.  We discussed treatment modalities of obstructive sleep apnea including CPAP device as well as oral mandibular advancement device. Sleep study will be scheduled to establish definitive diagnosis of sleep disorder breathing.  Weight loss will be strongly beneficial in order to reduce the severity of sleep-disordered breathing.  Patient has narrow oropharyngeal structure.  Caution during activities that require prolonged concentration is strongly advised.  Patient will be notified of sleep study results after sleep study is completed.  If sleep apnea is only mild,  oral mandibular advancement device may be one of the treatment options.  However if sleep apnea is moderately severe, CPAP treatment will be strongly encouraged.  The patient is not opposed to treatment with CPAP device if we confirm significant obstructive sleep apnea on polysomnography.     Thank you for allowing me to participate in your patient's care.    Farhan Watters,  MD  Sleep Medicine  12/04/19  8:34 AM

## 2019-12-06 ENCOUNTER — HOSPITAL ENCOUNTER (OUTPATIENT)
Dept: PHYSICAL THERAPY | Facility: HOSPITAL | Age: 44
Setting detail: THERAPIES SERIES
Discharge: HOME | End: 2019-12-06

## 2019-12-06 DIAGNOSIS — M25.511 CHRONIC RIGHT SHOULDER PAIN: Primary | ICD-10-CM

## 2019-12-06 DIAGNOSIS — G89.29 CHRONIC BILATERAL LOW BACK PAIN WITHOUT SCIATICA: ICD-10-CM

## 2019-12-06 DIAGNOSIS — M79.601 RIGHT ARM PAIN: ICD-10-CM

## 2019-12-06 DIAGNOSIS — G89.29 CHRONIC RIGHT SHOULDER PAIN: Primary | ICD-10-CM

## 2019-12-06 DIAGNOSIS — M54.50 CHRONIC BILATERAL LOW BACK PAIN WITHOUT SCIATICA: ICD-10-CM

## 2019-12-06 PROCEDURE — 97110 THERAPEUTIC EXERCISES: CPT

## 2019-12-06 PROCEDURE — 97140 MANUAL THERAPY 1/> REGIONS: CPT

## 2019-12-06 NOTE — THERAPY TREATMENT NOTE
"    Outpatient Physical Therapy Ortho Treatment Note  [facility]     Patient Name: Jocelyn Reyes  : 1975  MRN: [id number]  Today's Date: 2019      Visit Date: 2019    Visit Dx:    ICD-10-CM ICD-9-CM   1. Chronic right shoulder pain M25.511 719.41    G89.29 338.29   2. Right arm pain M79.601 729.5   3. Chronic bilateral low back pain without sciatica M54.5 724.2    G89.29 338.29       Patient Active Problem List   Diagnosis   • Apnea, sleep   • Factor V Leiden (CMS/[facility])   • History of DVT (deep vein thrombosis)        Past Medical History:   Diagnosis Date   • DVT (deep venous thrombosis) (CMS/[facility])        • Factor 5 Leiden mutation, heterozygous (CMS/[facility])         Past Surgical History:   Procedure Laterality Date   • BREAST SURGERY Bilateral 2009    breast reduction   • COLONOSCOPY N/A 2016    Procedure: COLONOSCOPY to cecum / TI;  Surgeon: Ulisses Yousif MD;  Location: [facility] ENDOSCOPY;  Service:    • COLONOSCOPY W/ POLYPECTOMY     • ENDOMETRIAL ABLATION     • ENDOSCOPY N/A 2016    Procedure: ESOPHAGOGASTRODUODENOSCOPY;  Surgeon: Ulisses Yousif MD;  Location: [facility] ENDOSCOPY;  Service:    • LAPAROSCOPIC TUBAL LIGATION     • TONSILLECTOMY     • TUBAL ABDOMINAL LIGATION                         PT Assessment/Plan     Row Name 19 1625          PT Assessment    Assessment Comments  Pt tolerates treatment well this date overall without issue. she reports much improved pain since her last session on Wednesday however woke last night w/ R scapular pain and continued pectoral \"burning\" throughout day today. Focus of treatment was on manual intervention to address regions of pain with reported reduction in symptoms. Pt observed to stand with anteriorly shifted pelvis leading to increased relative extension at lumbar spine with increased thoracic spine flexion and rounded shoulders. She was educated on importance of shifting weight back into heels to improve posture " and allow for greater proximal stability when needing to perform distal mobility tasks at work. Exercises modified to address overall postural awareness in standing. Plan to return to normal exercise progression next session if pain is well controlled.  -        PT Plan    PT Plan Comments  assess response to treatment, resume current strengthening program  -       User Key  (r) = Recorded By, (t) = Taken By, (c) = Cosigned By    Initials Name Provider Type     Corine Kee, PT Physical Therapist            OP Exercises     Row Name 12/06/19 1600 12/06/19 1500          Subjective Comments    Subjective Comments  Pt reports she feels muh better than she shoshana Wednesday however is still having burning senation and pain at proximal pec insertion mostly w/ prolonged UE activity. Also woke up with pain in between shoulder blades with difficulty getting back to sleep.   -  --        Subjective Pain    Able to rate subjective pain?  yes  -  --     Pre-Treatment Pain Level  5  -  --        Total Minutes    18661 - PT Therapeutic Exercise Minutes  10  -  --     16714 - PT Manual Therapy Minutes  --  30  -        Exercise 2    Exercise Name 2  scap squeeze over towel in standing  -  --  -     Reps 2  15  -  --  -     Time 2  5  -  --        Exercise 3    Exercise Name 3  RTC iso w/ belt  -  --  -     Reps 3  4  -  --  -     Time 3  30s  -  --  -        Exercise 4    Exercise Name 4  ppt at wall  -  --  -     Reps 4  15  -  --  -     Time 4  5  -  --        Exercise 7    Exercise Name 7  --  --  -     Reps 7  --  --  -        Exercise 8    Exercise Name 8  --  --  -     Reps 8  --  --  -        Exercise 9    Exercise Name 9  --  --  -     Reps 9  --  --  -        Exercise 10    Exercise Name 10  --  --  -     Reps 10  --  --  -        Exercise 11    Exercise Name 11  --  --  -     Reps 11  --  --  -       User Key  (r) = Recorded By, (t) = Taken By, (c) =  Cosigned By    Initials Name Provider Type     Corine Kee, PT Physical Therapist                      Manual Rx (last 36 hours)      Manual Treatments     Row Name 12/06/19 1500             Total Minutes    82890 - PT Manual Therapy Minutes  30  -         Manual Rx 1    Manual Rx 1 Location  Prone R scapular work; intrascapular tissues and cervical tissues  -      Manual Rx 1 Type  Then supine for R side cervical work   -      Manual Rx 1 Grade  Passive stretching to B upper traps, B levators  -      Manual Rx 1 Duration  Overpressure to B shoulders in inferior direction with first rib mobs on R  -         Manual Rx 2    Manual Rx 2 Location  gentle PA/cervical lat glides  -      Manual Rx 2 Type  Overpressure posterior to B shoulders, pectoral stretch  -         Manual Rx 3    Manual Rx 3 Location  thoracic spine  -      Manual Rx 3 Type  PA mobs  -      Manual Rx 3 Grade  III-  -        User Key  (r) = Recorded By, (t) = Taken By, (c) = Cosigned By    Initials Name Provider Type     Corine Kee, PT Physical Therapist          PT OP Goals     Row Name 12/06/19 1600          PT Short Term Goals    STG Date to Achieve  11/25/19  -     STG 1  Pt will demonstrate understanding and compliance with initial HEP.  -     STG 1 Progress  Partially Met  -     STG 2  Pt will report 0 occurences of waking from sleep to R pectoral region pain.  -     STG 2 Progress  Ongoing  -     STG 2 Progress Comments  woke last night due to R scapular pain  -     STG 3  Pt will tolerate shoulder row exercise without inc R sided neck or anterior chest pain.   -     STG 3 Progress  Ongoing  -        Long Term Goals    LTG Date to Achieve  12/11/19  -     LTG 1  Pt will demonstrate improved R UE AROM to 150 deg or better flexion/abduction without inc R shoulder symptoms.  -     LTG 1 Progress  Ongoing  -     LTG 2  Pt will demonstrate improved RUE strength to 4/5 or better flexion,  abduction, ER without inc R shoulder symptoms.  -     LTG 2 Progress  Ongoing  -     LTG 3  Pt will report pain at worse reduced from 8/10 to 5/10 or better in R shoulder.  [facility]  [facility]  -       User Key  (r) = Recorded By, (t) = Taken By, (c) = Cosigned By    Initials Name Provider Type    Corine Pan, PT Physical Therapist          Therapy Education  Education Details: postural awareness, avoiding anterior weight shift of pelvis to reduce strain to low back and improve core stability during necessary work tasks  Given: HEP, Symptoms/condition management, Pain management, Posture/body mechanics, Mobility training  Program: Reinforced  How Provided: Verbal  Provided to: Patient  Level of Understanding: Teach back education performed, Verbalized              Time Calculation:   Start Time: 1530  Stop Time: 1615  Time Calculation (min): 45 min  Therapy Charges for Today     Code Description Service Date Service Provider Modifiers Qty    60243298483  PT THER PROC EA 15 MIN 12/6/2019 Corine Kee, PT GP 1    [id number]  PT MANUAL THERAPY EA 15 MIN 12/6/2019 Corine Kee, PT GP 2                    Corine Kee, MEGHANN  12/6/2019

## 2019-12-09 ENCOUNTER — APPOINTMENT (OUTPATIENT)
Dept: PHYSICAL THERAPY | Facility: HOSPITAL | Age: 44
End: 2019-12-09

## 2019-12-11 ENCOUNTER — HOSPITAL ENCOUNTER (OUTPATIENT)
Dept: PHYSICAL THERAPY | Facility: HOSPITAL | Age: 44
Setting detail: THERAPIES SERIES
Discharge: HOME | End: 2019-12-11

## 2019-12-11 DIAGNOSIS — G89.29 CHRONIC BILATERAL LOW BACK PAIN WITHOUT SCIATICA: ICD-10-CM

## 2019-12-11 DIAGNOSIS — M25.511 CHRONIC RIGHT SHOULDER PAIN: Primary | ICD-10-CM

## 2019-12-11 DIAGNOSIS — G89.29 CHRONIC RIGHT SHOULDER PAIN: Primary | ICD-10-CM

## 2019-12-11 DIAGNOSIS — M54.50 CHRONIC BILATERAL LOW BACK PAIN WITHOUT SCIATICA: ICD-10-CM

## 2019-12-11 DIAGNOSIS — M79.601 RIGHT ARM PAIN: ICD-10-CM

## 2019-12-11 NOTE — THERAPY TREATMENT NOTE
Outpatient Physical Therapy Ortho Treatment Note  [facility]     Patient Name: Jocelyn Reyes  : 1975  MRN: [id number]  Today's Date: 2019      Visit Date: 2019    Visit Dx:    ICD-10-CM ICD-9-CM   1. Chronic right shoulder pain M25.511 719.41    G89.29 338.29   2. Right arm pain M79.601 729.5   3. Chronic bilateral low back pain without sciatica M54.5 724.2    G89.29 338.29       Patient Active Problem List   Diagnosis   • Apnea, sleep   • Factor V Leiden (CMS/[facility])   • History of DVT (deep vein thrombosis)        Past Medical History:   Diagnosis Date   • DVT (deep venous thrombosis) (CMS/[facility])        • Factor 5 Leiden mutation, heterozygous (CMS/[facility])         Past Surgical History:   Procedure Laterality Date   • BREAST SURGERY Bilateral 2009    breast reduction   • COLONOSCOPY N/A 2016    Procedure: COLONOSCOPY to cecum / TI;  Surgeon: Ulisses Yousif MD;  Location: [facility] ENDOSCOPY;  Service:    • COLONOSCOPY W/ POLYPECTOMY     • ENDOMETRIAL ABLATION     • ENDOSCOPY N/A 2016    Procedure: ESOPHAGOGASTRODUODENOSCOPY;  Surgeon: Ulisses Yousif MD;  Location: [facility] ENDOSCOPY;  Service:    • LAPAROSCOPIC TUBAL LIGATION     • TONSILLECTOMY     • TUBAL ABDOMINAL LIGATION                         PT Assessment/Plan     Row Name 19 1402          PT Assessment    Assessment Comments  Initated trial of DDN to R UT tissue today with many moderate LTRs and minimal pain response. Pt reports soreness following session and educated on aftercare instructions and HEP.   -CN        PT Plan    PT Plan Comments  Assess response to DDN and continue with R UT and possibly QL next visit.   -CN       User Key  (r) = Recorded By, (t) = Taken By, (c) = Cosigned By    Initials Name Provider Type    Ashley Johnson, PT Physical Therapist            OP Exercises     Row Name 19 1200             Subjective Comments    Subjective Comments  It has been getting better  and I am taking ibuproufen which is helping.   -CN         Subjective Pain    Able to rate subjective pain?  yes  -CN      Pre-Treatment Pain Level  4  -CN         Total Minutes    97615 - PT Therapeutic Exercise Minutes  5  -CN         Exercise 1    Exercise Name 1  Post shoulder rolls  -CN      Cueing 1  Demo  -CN      Reps 1  10  -CN         Exercise 2    Exercise Name 2  scap squeeze over towel in standing  -CN      Reps 2  15  -CN      Time 2  5  -CN         Exercise 5    Exercise Name 5  Seated UT stretch  -CN      Cueing 5  Demo  -CN      Reps 5  3  -CN      Time 5  20 sec  -CN        User Key  (r) = Recorded By, (t) = Taken By, (c) = Cosigned By    Initials Name Provider Type    Ashley Johnson, MEGHANN Physical Therapist                      Manual Rx (last 36 hours)      Manual Treatments     Row Name 12/11/19 1300             Manual Rx 4    Manual Rx 4 Location  Intramuscular manual therapy     Assessed pt. for Dry Needling and intramuscular manual therapy with DDN. Discussed risks/benefits of Dry Needling with pt, including but not limited to muscle soreness, bruising, vasovagal response, pneumothorax, nerve injury. Patient signed written consent to proceed with treatment.    Patient position during treatment: prone    Muscles treated:  R UT with pincer grasp    Response to treatment: several moderate LTRs and few large LTRs with minimal pain response. Pt tolerated treatment without immediate adverse response.     Clean needle technique observed at all times, precautions for lung fields, neurovascular structures observed.      Manual palpation performed before, during, and after session to facilitate assessment. -CN      Manual Rx 4 Duration  30 min  -CN        User Key  (r) = Recorded By, (t) = Taken By, (c) = Cosigned By    Initials Name Provider Type    Ashley Johnson, PT Physical Therapist          PT OP Goals     Row Name 12/11/19 1400          PT Short Term Goals    STG Date to  Achieve  11/25/19  -CN     STG 1  Pt will demonstrate understanding and compliance with initial HEP.  -CN     STG 1 Progress  Partially Met  -CN     STG 2  Pt will report 0 occurences of waking from sleep to R pectoral region pain.  -CN     STG 2 Progress  Ongoing  -CN     STG 3  Pt will tolerate shoulder row exercise without inc R sided neck or anterior chest pain.   -CN     STG 3 Progress  Ongoing  -CN     STG 3 Progress Comments  Able to perform scap squeeze without symptoms today.   -CN        Long Term Goals    LTG Date to Achieve  12/11/19  -CN     LTG 1  Pt will demonstrate improved R UE AROM to 150 deg or better flexion/abduction without inc R shoulder symptoms.  -CN     LTG 1 Progress  Ongoing  -CN     LTG 2  Pt will demonstrate improved RUE strength to 4/5 or better flexion, abduction, ER without inc R shoulder symptoms.  -CN     LTG 2 Progress  Ongoing  -CN     LTG 3  Pt will report pain at worse reduced from 8/10 to 5/10 or better in R shoulder.  -CN     LTG 3 Progress  Ongoing  -CN       User Key  (r) = Recorded By, (t) = Taken By, (c) = Cosigned By    Initials Name Provider Type    Ashley Johnson, PT Physical Therapist          Therapy Education  Given: HEP, Symptoms/condition management, Pain management, Posture/body mechanics, Mobility training  Program: Reinforced  How Provided: Verbal  Provided to: Patient  Level of Understanding: Teach back education performed, Verbalized              Time Calculation:   Start Time: 1215  Stop Time: 1250  Time Calculation (min): 35 min  Therapy Charges for Today     Code Description Service Date Service Provider Modifiers Qty    92935792336 HC PT SELF PAY DRY NEEDLING SESSION 12/11/2019 Ashley Conte, PT  1                    Ashley Conte, PT  12/11/2019

## 2019-12-17 ENCOUNTER — HOSPITAL ENCOUNTER (OUTPATIENT)
Dept: PHYSICAL THERAPY | Facility: HOSPITAL | Age: 44
Setting detail: THERAPIES SERIES
Discharge: HOME | End: 2019-12-17

## 2019-12-17 DIAGNOSIS — M25.511 CHRONIC RIGHT SHOULDER PAIN: Primary | ICD-10-CM

## 2019-12-17 DIAGNOSIS — G89.29 CHRONIC BILATERAL LOW BACK PAIN WITHOUT SCIATICA: ICD-10-CM

## 2019-12-17 DIAGNOSIS — M79.601 RIGHT ARM PAIN: ICD-10-CM

## 2019-12-17 DIAGNOSIS — G89.29 CHRONIC RIGHT SHOULDER PAIN: Primary | ICD-10-CM

## 2019-12-17 DIAGNOSIS — M54.50 CHRONIC BILATERAL LOW BACK PAIN WITHOUT SCIATICA: ICD-10-CM

## 2019-12-17 PROCEDURE — 97110 THERAPEUTIC EXERCISES: CPT

## 2019-12-17 NOTE — THERAPY TREATMENT NOTE
Outpatient Physical Therapy Ortho Treatment Note  [facility]     Patient Name: Jocelyn Reyes  : 1975  MRN: [id number]  Today's Date: 2019      Visit Date: 2019    Visit Dx:    ICD-10-CM ICD-9-CM   1. Chronic right shoulder pain M25.511 719.41    G89.29 338.29   2. Right arm pain M79.601 729.5   3. Chronic bilateral low back pain without sciatica M54.5 724.2    G89.29 338.29       Patient Active Problem List   Diagnosis   • Apnea, sleep   • Factor V Leiden (CMS/[facility])   • History of DVT (deep vein thrombosis)        Past Medical History:   Diagnosis Date   • DVT (deep venous thrombosis) (CMS/[facility])        • Factor 5 Leiden mutation, heterozygous (CMS/[facility])         Past Surgical History:   Procedure Laterality Date   • BREAST SURGERY Bilateral 2009    breast reduction   • COLONOSCOPY N/A 2016    Procedure: COLONOSCOPY to cecum / TI;  Surgeon: Ulisses Yousif MD;  Location: [facility] ENDOSCOPY;  Service:    • COLONOSCOPY W/ POLYPECTOMY     • ENDOMETRIAL ABLATION     • ENDOSCOPY N/A 2016    Procedure: ESOPHAGOGASTRODUODENOSCOPY;  Surgeon: Ulisses Yousif MD;  Location: [facility] ENDOSCOPY;  Service:    • LAPAROSCOPIC TUBAL LIGATION     • TONSILLECTOMY     • TUBAL ABDOMINAL LIGATION                         PT Assessment/Plan     Row Name 19 1530          PT Assessment    Assessment Comments  Continued with DDN to R UT tissues with many moderate to large LTRs noted. Pt repots immediate relief of burning in anterior shoulder with DDN as well as improved mobility observed with exercises.   -CN        PT Plan    PT Plan Comments  Continue with DDN as indicated.   -CN       User Key  (r) = Recorded By, (t) = Taken By, (c) = Cosigned By    Initials Name Provider Type    Ashley Johnson, PT Physical Therapist                            Manual Rx (last 36 hours)      Manual Treatments     Row Name 19 1500             Manual Rx 4    Manual Rx 4 Location   Intramuscular manual therapy     Assessed pt. for Dry Needling and intramuscular manual therapy with DDN. Discussed risks/benefits of Dry Needling with pt, including but not limited to muscle soreness, bruising, vasovagal response, pneumothorax, nerve injury. Patient signed written consent to proceed with treatment.    Patient position during treatment: prone    Muscles treated:  R UT with pincer grasp    Response to treatment: Many moderate to large LTRs with minimal pain response. Pt tolerated treatment without immediate adverse response and no burning noted at end of session.    Clean needle technique observed at all times, precautions for lung fields, neurovascular structures observed.      Manual palpation performed before, during, and after session to facilitate assessment. -CN      Manual Rx 4 Duration  25 min  -CN        User Key  (r) = Recorded By, (t) = Taken By, (c) = Cosigned By    Initials Name Provider Type    Ashley Johnson, PT Physical Therapist              Therapy Education  Given: HEP, Symptoms/condition management, Pain management, Posture/body mechanics, Mobility training  Program: Reinforced  How Provided: Verbal  Provided to: Patient  Level of Understanding: Teach back education performed, Verbalized              Time Calculation:   Start Time: 1440  Stop Time: 1505  Time Calculation (min): 25 min  Therapy Charges for Today     Code Description Service Date Service Provider Modifiers Qty    11765261663  PT SELF PAY DRY NEEDLING SESSION 12/17/2019 Ashley Conte, PT  1                    Ashley Conte, MEGHANN  12/17/2019

## 2019-12-17 NOTE — THERAPY TREATMENT NOTE
Outpatient Physical Therapy Ortho Treatment Note  [facility]     Patient Name: Jocelyn Reyes  : 1975  MRN: [id number]  Today's Date: 2019      Visit Date: 2019    Visit Dx:    ICD-10-CM ICD-9-CM   1. Chronic right shoulder pain M25.511 719.41    G89.29 338.29   2. Right arm pain M79.601 729.5       Patient Active Problem List   Diagnosis   • Apnea, sleep   • Factor V Leiden (CMS/[facility])   • History of DVT (deep vein thrombosis)        Past Medical History:   Diagnosis Date   • DVT (deep venous thrombosis) (CMS/[facility])        • Factor 5 Leiden mutation, heterozygous (CMS/[facility])         Past Surgical History:   Procedure Laterality Date   • BREAST SURGERY Bilateral     breast reduction   • COLONOSCOPY N/A 2016    Procedure: COLONOSCOPY to cecum / TI;  Surgeon: Ulisses Yousif MD;  Location: [facility] ENDOSCOPY;  Service:    • COLONOSCOPY W/ POLYPECTOMY     • ENDOMETRIAL ABLATION     • ENDOSCOPY N/A 2016    Procedure: ESOPHAGOGASTRODUODENOSCOPY;  Surgeon: Ulisses Yousif MD;  Location: [facility] ENDOSCOPY;  Service:    • LAPAROSCOPIC TUBAL LIGATION     • TONSILLECTOMY     • TUBAL ABDOMINAL LIGATION                         PT Assessment/Plan     Row Name 19 1523          PT Assessment    Assessment Comments  Pt reports reduced pain since DDN last visit with resolution of anterior shoulder/clavicular burning until this am. Continued with gentle postural strengthening with cues to dec UT activtion and correct retraction movement.   -CN        PT Plan    PT Plan Comments  Continue with progressive strengthening and stretching as tolerated.   -CN       User Key  (r) = Recorded By, (t) = Taken By, (c) = Cosigned By    Initials Name Provider Type    Ashley Johnson, PT Physical Therapist            OP Exercises     Row Name 19 1400             Subjective Comments    Subjective Comments  It felt good after the needling. It made the burning go away until  today. I was really sore though for a couple days.   -CN         Subjective Pain    Able to rate subjective pain?  yes  -CN      Pre-Treatment Pain Level  3  -CN         Total Minutes    64401 - PT Therapeutic Exercise Minutes  10  -CN         Exercise 1    Exercise Name 1  Post shoulder rolls  -CN      Cueing 1  Demo  -CN      Reps 1  10  -CN         Exercise 5    Exercise Name 5  Seated UT stretch  -CN      Cueing 5  Demo  -CN      Reps 5  3  -CN      Time 5  20 sec  -CN         Exercise 6    Exercise Name 6  Rows  -CN      Reps 6  15  -CN      Time 6  3 sec hold, GTB  -CN      Additional Comments  cues for form  -CN         Exercise 7    Exercise Name 7  Shoulder extension  -CN      Cueing 7  Tactile  -CN      Reps 7  15  -CN      Additional Comments  GTB  -CN         Exercise 9    Exercise Name 9  B shldr ER, noodle behind back on wall  -CN      Reps 9  15  -CN      Additional Comments  RTB  -CN         Exercise 12    Exercise Name 12  Chin tucks  -CN      Cueing 12  Demo  -CN      Reps 12  10  -CN      Time 12  5 sec  -CN        User Key  (r) = Recorded By, (t) = Taken By, (c) = Cosigned By    Initials Name Provider Type    Ashley Johnson, PT Physical Therapist                       PT OP Goals     Row Name 12/17/19 1500          PT Short Term Goals    STG Date to Achieve  11/25/19  -CN     STG 1  Pt will demonstrate understanding and compliance with initial HEP.  -CN     STG 1 Progress  Partially Met  -CN     STG 2  Pt will report 0 occurences of waking from sleep to R pectoral region pain.  -CN     STG 2 Progress  Ongoing  -CN     STG 2 Progress Comments  Pt reports minimal burning through R anterior shoulder since DDN last visit.   -CN     STG 3  Pt will tolerate shoulder row exercise without inc R sided neck or anterior chest pain.   -CN     STG 3 Progress  Ongoing  -CN        Long Term Goals    LTG Date to Achieve  12/11/19  -CN     LTG 1  Pt will demonstrate improved R UE AROM to 150 deg or  better flexion/abduction without inc R shoulder symptoms.  -CN     LTG 1 Progress  Ongoing  -CN     LTG 2  Pt will demonstrate improved RUE strength to 4/5 or better flexion, abduction, ER without inc R shoulder symptoms.  -CN     LTG 2 Progress  Ongoing  -CN     LTG 3  Pt will report pain at worse reduced from 8/10 to 5/10 or better in R shoulder.  -CN     LTG 3 Progress  Ongoing  -CN       User Key  (r) = Recorded By, (t) = Taken By, (c) = Cosigned By    Initials Name Provider Type    Ashley Johnson, PT Physical Therapist          Therapy Education  Given: HEP, Symptoms/condition management, Pain management, Posture/body mechanics, Mobility training  Program: Reinforced  How Provided: Verbal  Provided to: Patient  Level of Understanding: Teach back education performed, Verbalized              Time Calculation:   Start Time: 1430  Stop Time: 1440  Time Calculation (min): 10 min  Therapy Charges for Today     Code Description Service Date Service Provider Modifiers Qty    97905434225 HC PT THER PROC EA 15 MIN 12/17/2019 Ashley Conte, PT GP 1                    Ashley Conte PT  12/17/2019

## 2019-12-19 ENCOUNTER — HOSPITAL ENCOUNTER (OUTPATIENT)
Dept: PHYSICAL THERAPY | Facility: HOSPITAL | Age: 44
Setting detail: THERAPIES SERIES
Discharge: HOME | End: 2019-12-19

## 2019-12-19 DIAGNOSIS — M79.601 RIGHT ARM PAIN: ICD-10-CM

## 2019-12-19 DIAGNOSIS — G89.29 CHRONIC RIGHT SHOULDER PAIN: Primary | ICD-10-CM

## 2019-12-19 DIAGNOSIS — M54.50 CHRONIC BILATERAL LOW BACK PAIN WITHOUT SCIATICA: ICD-10-CM

## 2019-12-19 DIAGNOSIS — G89.29 CHRONIC BILATERAL LOW BACK PAIN WITHOUT SCIATICA: ICD-10-CM

## 2019-12-19 DIAGNOSIS — M25.511 CHRONIC RIGHT SHOULDER PAIN: Primary | ICD-10-CM

## 2019-12-19 PROCEDURE — 97110 THERAPEUTIC EXERCISES: CPT

## 2019-12-19 NOTE — THERAPY TREATMENT NOTE
Outpatient Physical Therapy Ortho Treatment Note  [facility]     Patient Name: Jocelyn Reyes  : 1975  MRN: [id number]  Today's Date: 2019      Visit Date: 2019    Visit Dx:    ICD-10-CM ICD-9-CM   1. Chronic right shoulder pain M25.511 719.41    G89.29 338.29   2. Right arm pain M79.601 729.5   3. Chronic bilateral low back pain without sciatica M54.5 724.2    G89.29 338.29       Patient Active Problem List   Diagnosis   • Apnea, sleep   • Factor V Leiden (CMS/[facility])   • History of DVT (deep vein thrombosis)        Past Medical History:   Diagnosis Date   • DVT (deep venous thrombosis) (CMS/[facility])        • Factor 5 Leiden mutation, heterozygous (CMS/[facility])         Past Surgical History:   Procedure Laterality Date   • BREAST SURGERY Bilateral 2009    breast reduction   • COLONOSCOPY N/A 2016    Procedure: COLONOSCOPY to cecum / TI;  Surgeon: Ulisses Yousif MD;  Location: [facility] ENDOSCOPY;  Service:    • COLONOSCOPY W/ POLYPECTOMY     • ENDOMETRIAL ABLATION     • ENDOSCOPY N/A 2016    Procedure: ESOPHAGOGASTRODUODENOSCOPY;  Surgeon: Ulisses Yousif MD;  Location: [facility] ENDOSCOPY;  Service:    • LAPAROSCOPIC TUBAL LIGATION     • TONSILLECTOMY     • TUBAL ABDOMINAL LIGATION                         PT Assessment/Plan     Row Name 19 1319          PT Assessment    Assessment Comments  Pt progressing well with skilled PT services. She reports elimination of pain in R pec region following second round of DDN 2 days ago. Focused on postural strengthening today with good tolerance. Pt reports near constant tightness in lumbar spine. Suspect this is due to combination of dec strength and poor positioning with work duties with inc time spent in flexion. Added hip bridge and glute set today to address.   -LB        PT Plan    PT Plan Comments  Continue postural improvement, posterior chain strengthening, DDN if needed.   -LB       User Key  (r) = Recorded By, (t) =  Taken By, (c) = Cosigned By    Initials Name Provider Type    LB Jahaira Angela, PT Physical Therapist            OP Exercises     Row Name 12/19/19 1300             Subjective Comments    Subjective Comments  I was so much better after this DDN compared to the first. I was sore but the pain is gone on the anterior of my chest.  -LB         Subjective Pain    Able to rate subjective pain?  yes  -LB      Pre-Treatment Pain Level  2  -LB      Subjective Pain Comment  My low back is bothering me some.  -LB         Total Minutes    74453 - PT Therapeutic Exercise Minutes  40  -LB         Exercise 1    Exercise Name 1  UBE  -LB      Time 1  4 minutes  -LB      Additional Comments  Level 1  -LB         Exercise 2    Exercise Name 2  supine on blue foam roll  -LB      Time 2  2 minutes  -LB      Additional Comments  pec stretch  -LB         Exercise 3    Exercise Name 3  supine on blue foam roll  -LB      Reps 3  15  -LB      Additional Comments  HA; GTB  -LB         Exercise 4    Exercise Name 4  supine on blue foam roll  -LB      Reps 4  15  -LB      Additional Comments  B ER; GTB  -LB         Exercise 5    Exercise Name 5  SL upper trunk rotation  -LB      Reps 5  10  -LB      Additional Comments  each   -LB         Exercise 6    Exercise Name 6  HL hip adduction + PPT  -LB      Reps 6  10  -LB      Time 6  10  -LB         Exercise 7    Exercise Name 7  HL hip bridge  -LB      Reps 7  10  -LB      Time 7  3  -LB      Additional Comments  cuing for glute set  -LB         Exercise 8    Exercise Name 8  seated lat pull  -LB      Sets 8  2  -LB      Reps 8  10  -LB      Additional Comments  GTB  -LB         Exercise 9    Exercise Name 9  seated ball rollout  -LB      Reps 9  3  -LB      Time 9  20  -LB      Additional Comments  blue ball  -LB         Exercise 10    Exercise Name 10  pec doorway stretch  -LB      Reps 10  3  -LB      Time 10  20  -LB         Exercise 11    Exercise Name 11  seated thoracic extension with pec  opening   -LB      Reps 11  3  -LB      Time 11  20  -LB      Additional Comments  towel roll alone spine  -LB        User Key  (r) = Recorded By, (t) = Taken By, (c) = Cosigned By    Initials Name Provider Type    Jahaira Galloway PT Physical Therapist                       PT OP Goals     Row Name 12/19/19 1300          PT Short Term Goals    STG Date to Achieve  11/25/19  -LB     STG 1  Pt will demonstrate understanding and compliance with initial HEP.  -LB     STG 1 Progress  Partially Met  -LB     STG 2  Pt will report 0 occurences of waking from sleep to R pectoral region pain.  -LB     STG 2 Progress  Ongoing  -LB     STG 3  Pt will tolerate shoulder row exercise without inc R sided neck or anterior chest pain.   -LB     STG 3 Progress  Met  -LB        Long Term Goals    LTG Date to Achieve  12/11/19  -LB     LTG 1  Pt will demonstrate improved R UE AROM to 150 deg or better flexion/abduction without inc R shoulder symptoms.  -LB     LTG 1 Progress  Ongoing  -LB     LTG 2  Pt will demonstrate improved RUE strength to 4/5 or better flexion, abduction, ER without inc R shoulder symptoms.  -LB     LTG 2 Progress  Ongoing  -LB     LTG 3  Pt will report pain at worse reduced from 8/10 to 5/10 or better in R shoulder.  -LB     LTG 3 Progress  Ongoing  -LB       User Key  (r) = Recorded By, (t) = Taken By, (c) = Cosigned By    Initials Name Provider Type    Jahaira Galloway PT Physical Therapist          Therapy Education  Education Details: continued to review postural modifications, discussed need for strengthening core/lumbar extensors to support lumbar spine  Given: Symptoms/condition management, HEP  Program: Reinforced  How Provided: Verbal  Provided to: Patient  Level of Understanding: Teach back education performed, Verbalized, Demonstrated              Time Calculation:   Start Time: 1200  Stop Time: 1245  Time Calculation (min): 45 min  Total Timed Code Minutes- PT: 40 minute(s)  Therapy Charges for  Today     Code Description Service Date Service Provider Modifiers Qty    40356760822 HC PT THER PROC EA 15 MIN 12/19/2019 Jahaira Angela, PT GP 3                    Jahaira Angela, PT  12/19/2019

## 2019-12-31 ENCOUNTER — HOSPITAL ENCOUNTER (OUTPATIENT)
Dept: PHYSICAL THERAPY | Facility: HOSPITAL | Age: 44
Setting detail: THERAPIES SERIES
Discharge: HOME | End: 2019-12-31

## 2019-12-31 DIAGNOSIS — G89.29 CHRONIC RIGHT SHOULDER PAIN: Primary | ICD-10-CM

## 2019-12-31 DIAGNOSIS — M79.601 RIGHT ARM PAIN: ICD-10-CM

## 2019-12-31 DIAGNOSIS — M54.50 CHRONIC BILATERAL LOW BACK PAIN WITHOUT SCIATICA: ICD-10-CM

## 2019-12-31 DIAGNOSIS — M25.511 CHRONIC RIGHT SHOULDER PAIN: Primary | ICD-10-CM

## 2019-12-31 DIAGNOSIS — G89.29 CHRONIC BILATERAL LOW BACK PAIN WITHOUT SCIATICA: ICD-10-CM

## 2019-12-31 PROCEDURE — 97110 THERAPEUTIC EXERCISES: CPT

## 2020-01-03 ENCOUNTER — HOSPITAL ENCOUNTER (OUTPATIENT)
Dept: SLEEP MEDICINE | Facility: HOSPITAL | Age: 45
Discharge: HOME | End: 2020-01-03
Admitting: FAMILY MEDICINE

## 2020-01-03 ENCOUNTER — HOSPITAL ENCOUNTER (OUTPATIENT)
Dept: PHYSICAL THERAPY | Facility: HOSPITAL | Age: 45
Setting detail: THERAPIES SERIES
Discharge: HOME | End: 2020-01-03

## 2020-01-03 DIAGNOSIS — G47.10 HYPERSOMNIA: ICD-10-CM

## 2020-01-03 DIAGNOSIS — M54.50 CHRONIC BILATERAL LOW BACK PAIN WITHOUT SCIATICA: ICD-10-CM

## 2020-01-03 DIAGNOSIS — M79.601 RIGHT ARM PAIN: ICD-10-CM

## 2020-01-03 DIAGNOSIS — G47.8 NON-RESTORATIVE SLEEP: ICD-10-CM

## 2020-01-03 DIAGNOSIS — G89.29 CHRONIC RIGHT SHOULDER PAIN: Primary | ICD-10-CM

## 2020-01-03 DIAGNOSIS — R06.83 SNORING: ICD-10-CM

## 2020-01-03 DIAGNOSIS — M25.511 CHRONIC RIGHT SHOULDER PAIN: Primary | ICD-10-CM

## 2020-01-03 DIAGNOSIS — G89.29 CHRONIC BILATERAL LOW BACK PAIN WITHOUT SCIATICA: ICD-10-CM

## 2020-01-03 PROCEDURE — 95806 SLEEP STUDY UNATT&RESP EFFT: CPT

## 2020-01-03 PROCEDURE — 95806 SLEEP STUDY UNATT&RESP EFFT: CPT | Performed by: FAMILY MEDICINE

## 2020-01-03 PROCEDURE — 97110 THERAPEUTIC EXERCISES: CPT | Performed by: PHYSICAL THERAPIST

## 2020-01-03 NOTE — THERAPY TREATMENT NOTE
"    Outpatient Physical Therapy Ortho Treatment Note  [facility]     Patient Name: Jocelyn Reyes  : 1975  MRN: [id number]  Today's Date: 1/3/2020      Visit Date: 2020    Visit Dx:    ICD-10-CM ICD-9-CM   1. Chronic right shoulder pain M25.511 719.41    G89.29 338.29   2. Right arm pain M79.601 729.5   3. Chronic bilateral low back pain without sciatica M54.5 724.2    G89.29 338.29       Patient Active Problem List   Diagnosis   • Apnea, sleep   • Factor V Leiden (CMS/[facility])   • History of DVT (deep vein thrombosis)        Past Medical History:   Diagnosis Date   • DVT (deep venous thrombosis) (CMS/[facility])        • Factor 5 Leiden mutation, heterozygous (CMS/[facility])         Past Surgical History:   Procedure Laterality Date   • BREAST SURGERY Bilateral 2009    breast reduction   • COLONOSCOPY N/A 2016    Procedure: COLONOSCOPY to cecum / TI;  Surgeon: Ulisses Yousif MD;  Location: [facility] ENDOSCOPY;  Service:    • COLONOSCOPY W/ POLYPECTOMY     • ENDOMETRIAL ABLATION     • ENDOSCOPY N/A 2016    Procedure: ESOPHAGOGASTRODUODENOSCOPY;  Surgeon: Ulisses Yousif MD;  Location: [facility] ENDOSCOPY;  Service:    • LAPAROSCOPIC TUBAL LIGATION     • TONSILLECTOMY     • TUBAL ABDOMINAL LIGATION                         PT Assessment/Plan     Row Name 20 1232          PT Assessment    Assessment Comments  Recurrent lumbar strain reported since last session though she states \"quicker rebound than ever before\" with use of stretching and outside massage work. Root cause of recurrent issues most likely due to lack of strength and endurance in extensor tissues. Progressed strengthening exercises today with caution due to latest episode. She remains appropriate for progressive strengthening into quadruped positions as symptoms allow.  -GATITO       User Key  (r) = Recorded By, (t) = Taken By, (c) = Cosigned By    Initials Name Provider Type    Abdelrahman Garcia, PT Physical Therapist    "         OP Exercises     Row Name 01/03/20 1000             Subjective Comments    Subjective Comments  I strained my back again on Wed night reaching down for something on the low shelf at old [place]. I was reaching and bending at an angle. I came home and did my stretches and got a massage the next day and it felt better. Overall I think I am bouncing back from my back strains quicker than before.   -CJ         Subjective Pain    Able to rate subjective pain?  yes  -CJ      Pre-Treatment Pain Level  3  -CJ         Total Minutes    96595 - PT Therapeutic Exercise Minutes  42  -CJ         Exercise 2    Exercise Name 2  supine on blue foam roll  -CJ      Time 2  2 minutes  -CJ      Additional Comments  pectoral stretch-10 deep breaths  -CJ         Exercise 3    Exercise Name 3  supine on blue foam roll  -CJ      Reps 3  15  -CJ      Additional Comments  HA: GTB  -CJ         Exercise 4    Exercise Name 4  supine on blue foam roll  -CJ      Reps 4  15  -CJ      Additional Comments  B ER: GTB  -CJ         Exercise 5    Exercise Name 5  SL upper trunk rotation  -CJ      Reps 5  2 B  -CJ      Time 5  30sec  -CJ         Exercise 6    Exercise Name 6  PPT with MIP  -CJ      Reps 6  10  -CJ      Time 6  10 sec  -CJ         Exercise 7    Exercise Name 7  HL hip bridge w/ adduction  -CJ      Cueing 7  Tactile  -CJ      Reps 7  15  -CJ      Time 7  3  -CJ      Additional Comments  squeeze and lift  -CJ         Exercise 8    Exercise Name 8  seated lat pull  -CJ      Sets 8  2  -CJ      Reps 8  10  -CJ      Additional Comments  BTB  -CJ         Exercise 9    Exercise Name 9  seated ball rollout  -CJ      Reps 9  3  -CJ      Time 9  20  -CJ         Exercise 10    Exercise Name 10  pec doorway stretch  -CJ      Reps 10  2  -CJ      Time 10  20  -CJ         Exercise 12    Exercise Name 12  Doorway lat stretch  -CJ      Cueing 12  Tactile;Demo  -CJ      Reps 12  2  -CJ      Time 12  20 Sec  -CJ         Exercise 13    Exercise Name 13   Shldr. Ext, GTB  -CJ      Reps 13  20  -CJ         Exercise 14    Exercise Name 14  serratus punches on blue noodle  -      Reps 14  20  -CJ      Additional Comments  #3  -CJ         Exercise 15    Exercise Name 15  B clamshells  -CJ      Reps 15  20  -CJ      Time 15  3 sec open/close  -CJ      Additional Comments  nice and slow  -CJ         Exercise 16    Exercise Name 16  Prone supermans  -      Reps 16  10  -CJ      Additional Comments  TA cont. with pillow under abdomen  -CJ         Exercise 17    Exercise Name 17  ROWs, GTB  -CJ      Reps 17  20  -CJ      Time 17  5 sec  -CJ        User Key  (r) = Recorded By, (t) = Taken By, (c) = Cosigned By    Initials Name Provider Type     Abdelrahman Walsh, PT Physical Therapist                                          Time Calculation:   Start Time: 1000  Stop Time: 1042  Time Calculation (min): 42 min  Total Timed Code Minutes- PT: 42 minute(s)  Therapy Charges for Today     Code Description Service Date Service Provider Modifiers Qty    10533341682 HC PT THER PROC EA 15 MIN 1/3/2020 Abdelrahman Walsh, PT GP 3                    Abdelrahman Walsh PT  1/3/2020

## 2020-01-07 ENCOUNTER — HOSPITAL ENCOUNTER (OUTPATIENT)
Dept: PHYSICAL THERAPY | Facility: HOSPITAL | Age: 45
Setting detail: THERAPIES SERIES
Discharge: HOME | End: 2020-01-07

## 2020-01-07 DIAGNOSIS — G89.29 CHRONIC RIGHT SHOULDER PAIN: Primary | ICD-10-CM

## 2020-01-07 DIAGNOSIS — M54.50 CHRONIC BILATERAL LOW BACK PAIN WITHOUT SCIATICA: ICD-10-CM

## 2020-01-07 DIAGNOSIS — M79.601 RIGHT ARM PAIN: ICD-10-CM

## 2020-01-07 DIAGNOSIS — M25.511 CHRONIC RIGHT SHOULDER PAIN: Primary | ICD-10-CM

## 2020-01-07 DIAGNOSIS — G89.29 CHRONIC BILATERAL LOW BACK PAIN WITHOUT SCIATICA: ICD-10-CM

## 2020-01-07 PROCEDURE — 97110 THERAPEUTIC EXERCISES: CPT

## 2020-01-08 NOTE — THERAPY PROGRESS REPORT/RE-CERT
Outpatient Physical Therapy Ortho Re-Assessment  [facility]     Patient Name: Jocelyn Reyes  : 1975  MRN: [id number]  Today's Date: 2020      Visit Date: 2020    Patient Active Problem List   Diagnosis   • Apnea, sleep   • Factor V Leiden (CMS/HCC)   • History of DVT (deep vein thrombosis)        Past Medical History:   Diagnosis Date   • DVT (deep venous thrombosis) (CMS/HCC)        • Factor 5 Leiden mutation, heterozygous (CMS/HCC)         Past Surgical History:   Procedure Laterality Date   • BREAST SURGERY Bilateral     breast reduction   • COLONOSCOPY N/A 2016    Procedure: COLONOSCOPY to cecum / TI;  Surgeon: Ulisses Yousif MD;  Location: [facility] ENDOSCOPY;  Service:    • COLONOSCOPY W/ POLYPECTOMY     • ENDOMETRIAL ABLATION     • ENDOSCOPY N/A 2016    Procedure: ESOPHAGOGASTRODUODENOSCOPY;  Surgeon: Ulisses Yousif MD;  Location: [facility] ENDOSCOPY;  Service:    • LAPAROSCOPIC TUBAL LIGATION     • TONSILLECTOMY     • TUBAL ABDOMINAL LIGATION         Visit Dx:     ICD-10-CM ICD-9-CM   1. Chronic right shoulder pain M25.511 719.41    G89.29 338.29   2. Right arm pain M79.601 729.5   3. Chronic bilateral low back pain without sciatica M54.5 724.2    G89.29 338.29                   20 1500   Subjective Comments   Subjective Comments It is feeling pretty good. My neck feels looser and my back is feeling better than last week. Pt arrived 3:32 for 3:15 appointment.    Subjective Pain   Able to rate subjective pain? yes   Pre-Treatment Pain Level 0   Total Minutes   66589 - PT Therapeutic Exercise Minutes 28   Exercise 1   Exercise Name 1 Nustep UEs with TA   Cueing 1 Demo   Time 1 5 min   Exercise 2   Exercise Name 2 supine on blue foam roll   Time 2 2 minutes   Additional Comments pectoral stretch-10 deep breaths   Exercise 3   Exercise Name 3 supine on blue foam roll   Reps 3 15   Additional Comments scap clock: BTB   Exercise 4   Exercise Name 4  supine on blue foam roll   Reps 4 15   Additional Comments B ER: BTB   Exercise 7   Exercise Name 7 HL hip bridge w/ adduction   Cueing 7 Tactile   Reps 7 15   Time 7 3   Additional Comments squeeze and lift   Exercise 14   Exercise Name 14 serratus punches on blue noodle   Reps 14 20   Additional Comments 3#   Exercise 16   Exercise Name 16 Prone supermans   Reps 16 10   Additional Comments TA cont. with pillow under abdomen   Exercise 18   Exercise Name 18 Bird/dog, small range with dowel on low back   Cueing 18 Demo   Reps 18 5 ea             01/07/20 1500   PT Short Term Goals   STG Date to Achieve 11/25/19   STG 1 Pt will demonstrate understanding and compliance with initial HEP.   STG 1 Progress Met   STG 2 Pt will report 0 occurences of waking from sleep to R pectoral region pain.   STG 2 Progress Ongoing   STG 2 Progress Comments Waking at least 1 times per night.    STG 3 Pt will tolerate shoulder row exercise without inc R sided neck or anterior chest pain.    STG 3 Progress Met   Long Term Goals   LTG Date to Achieve 12/11/19   LTG 1 Pt will demonstrate improved R UE AROM to 150 deg or better flexion/abduction without inc R shoulder symptoms.   LTG 1 Progress Progressing   LTG 2 Pt will demonstrate improved RUE strength to 4/5 or better flexion, abduction, ER without inc R shoulder symptoms.   LTG 2 Progress Progressing   LTG 2 Progress Comments Pt with improved strength, and minimal pain noted.    LTG 3 Pt will report pain at worse reduced from 8/10 to 5/10 or better in R shoulder.   LTG 3 Progress Progressing   LTG 3 Progress Comments Pt continues with increased pain levels at end of work day.            Therapy Education  Given: Symptoms/condition management, HEP  Program: Reinforced  How Provided: Verbal  Provided to: Patient  Level of Understanding: Teach back education performed, Verbalized, Demonstrated         PT Assessment/Plan     Row Name 01/07/20 1551          PT Assessment    Functional  Limitations  Limitation in home management;Performance in leisure activities;Performance in work activities;Limitations in community activities;Performance in self-care ADL;Limitations in functional capacity and performance  -CN     Impairments  Impaired flexibility;Sensation;Poor body mechanics;Posture;Muscle strength;Pain;Range of motion;Joint mobility;Impaired muscle power  -CN     Assessment Comments  Pt has attended 12 skiled therapy sessions for treatment of shoulder and low back pain. Pt reports shoulder pain is almost back to baseline prior to most recent flare up. Pt attempting to modify work environment, although not always possible. Pt reports minimal pain while working, however increased pain at end of the work day, improved overall since initiating therapy. Pt continues with core/hip weakness and would benefit from skilled PT to improve stability and postural strength.   -CN     Please refer to paper survey for additional self-reported information  Yes  -CN     Rehab Potential  Good  -CN     Patient/caregiver participated in establishment of treatment plan and goals  Yes  -CN     Patient would benefit from skilled therapy intervention  Yes  -CN        PT Plan    PT Frequency  2x/week;1x/week  -CN     Predicted Duration of Therapy Intervention (Therapy Eval)  3-4 weeks  -CN     PT Plan Comments  Continue with current POC.   -CN       User Key  (r) = Recorded By, (t) = Taken By, (c) = Cosigned By    Initials Name Provider Type    Ashley Johnson, PT Physical Therapist                              Outcome Measure Options: Disabilities of the Arm, Shoulder, and Hand (DASH), Modifed [place]  DASH  Open a tight or new jar.: Moderate Difficulty  Write: No Difficulty  Turn a key: No Difficulty  Prepare a meal: Mild Difficulty  Push open a heavy door: Moderate Difficulty  Place an object on a shelf above your head: Moderate Difficulty  Do heavy household chores (e.g., wash walls, wash floors):  Moderate Difficulty  Garden or do yard work: Severe Difficulty  Make a bed: Mild Difficulty  Carry a shopping bag or briefcase: Mild Difficulty  Carry a heavy object (over 10 lbs): Moderate Difficulty  Change a lightbulb overhead: Moderate Difficulty  Wash or blow dry your hair: Mild Difficulty  Wash your back: Mild Difficulty  Put on a pullover sweater: Mild Difficulty  Use a knife to cut food: Mild Difficulty  Recreational activities in which require little effort (e.g., cardplaying, knitting, etc.): No Difficulty  Recreational activities in which you take some force or impact through your arm, should or hand (e.g. golf, hammering, tennis, etc.): Moderate Difficulty  Recreational Activities in which you move your arm freely (e.g., frisbee, badminton, etc.): Moderate Difficulty  Manage transportation needs (getting from one place to another): No Difficulty  Sexual Activities: Mild Difficulty  During the past week, to what extent has your arm, shoulder, or hand problem interfered with your normal social activites with family, friends, neighbors or groups?: Not at all  During the past week, were you limited in your work or other regular daily activities as a result of your arm, shoulder or hand problem?: Slightly Limited  Arm, Shoulder, or hand pain: Mild  Arm, shoulder or hand pain when you performed any specific activity: Mild  Tingling (pins and needles) in your arm, shoulder, or hand: None  Weakness in your arm, shoulder or hand: Moderate  Stiffness in your arm, shoulder or hand: Mild  During the past week, how much difficulty have you had sleeping because of the pain in your arm, shoulder or hand?: Mild Difficulty  I feel less capable, less confident or less useful because of my arm, shoulder or hand problem: Neither Agree nor Disagree  DASH Sum : 66  Number of Questions Answered: 30  DASH Score: 30  Modified Oswestry  Modified Oswestry Score/Comments: 22% where 0% is no disability      Time Calculation:      Start Time: 1532  Stop Time: 1600  Time Calculation (min): 28 min     Therapy Charges for Today     Code Description Service Date Service Provider Modifiers Qty    64794909256 HC PT THER PROC EA 15 MIN 1/7/2020 Ashley Conte, PT GP 2          PT G-Codes  Outcome Measure Options: Disabilities of the Arm, Shoulder, and Hand (DASH), Cee Guido  Modified Oswestry Score/Comments: 22% where 0% is no disability         Ashley Conte, PT  1/7/2020

## 2020-01-10 ENCOUNTER — APPOINTMENT (OUTPATIENT)
Dept: PHYSICAL THERAPY | Facility: HOSPITAL | Age: 45
End: 2020-01-10

## 2020-01-13 ENCOUNTER — HOSPITAL ENCOUNTER (OUTPATIENT)
Dept: PHYSICAL THERAPY | Facility: HOSPITAL | Age: 45
Setting detail: THERAPIES SERIES
Discharge: HOME | End: 2020-01-13

## 2020-01-13 DIAGNOSIS — M25.511 CHRONIC RIGHT SHOULDER PAIN: Primary | ICD-10-CM

## 2020-01-13 DIAGNOSIS — M54.50 CHRONIC BILATERAL LOW BACK PAIN WITHOUT SCIATICA: ICD-10-CM

## 2020-01-13 DIAGNOSIS — G89.29 CHRONIC BILATERAL LOW BACK PAIN WITHOUT SCIATICA: ICD-10-CM

## 2020-01-13 DIAGNOSIS — G89.29 CHRONIC RIGHT SHOULDER PAIN: Primary | ICD-10-CM

## 2020-01-13 DIAGNOSIS — M79.601 RIGHT ARM PAIN: ICD-10-CM

## 2020-01-13 PROCEDURE — 97110 THERAPEUTIC EXERCISES: CPT | Performed by: PHYSICAL THERAPIST

## 2020-01-13 NOTE — THERAPY TREATMENT NOTE
Outpatient Physical Therapy Ortho Treatment Note  [facility]     Patient Name: Jocelyn Reyes  : 1975  MRN: [id number]  Today's Date: 2020      Visit Date: 2020    Visit Dx:    ICD-10-CM ICD-9-CM   1. Chronic right shoulder pain M25.511 719.41    G89.29 338.29   2. Right arm pain M79.601 729.5   3. Chronic bilateral low back pain without sciatica M54.5 724.2    G89.29 338.29       Patient Active Problem List   Diagnosis   • Apnea, sleep   • Factor V Leiden (CMS/[facility])   • History of DVT (deep vein thrombosis)        Past Medical History:   Diagnosis Date   • DVT (deep venous thrombosis) (CMS/[facility])        • Factor 5 Leiden mutation, heterozygous (CMS/[facility])         Past Surgical History:   Procedure Laterality Date   • BREAST SURGERY Bilateral 2009    breast reduction   • COLONOSCOPY N/A 2016    Procedure: COLONOSCOPY to cecum / TI;  Surgeon: Ulisses Yousif MD;  Location: [facility] ENDOSCOPY;  Service:    • COLONOSCOPY W/ POLYPECTOMY     • ENDOMETRIAL ABLATION     • ENDOSCOPY N/A 2016    Procedure: ESOPHAGOGASTRODUODENOSCOPY;  Surgeon: Ulisses Yousif MD;  Location: [facility] ENDOSCOPY;  Service:    • LAPAROSCOPIC TUBAL LIGATION     • TONSILLECTOMY     • TUBAL ABDOMINAL LIGATION                         PT Assessment/Plan     Row Name 20 1623          PT Assessment    Assessment Comments  Patient reports 80% improvement in chest pain, 60% improvement in neck pain, and 70% improvement in low back pain. Continued with strengthening program, adding leg press. Would benefit from progression at 1x/week as she is compliant with HEP to date.  Need to progress proximal hip strengthening exercises.   -GATITO       User Key  (r) = Recorded By, (t) = Taken By, (c) = Cosigned By    Initials Name Provider Type    Abdelrahman Garcia, PT Physical Therapist            OP Exercises     Row Name 20 1500             Subjective Comments    Subjective Comments  My chest is 80% better,  my neck is 60% better, and my back is 70% better.  -CJ         Subjective Pain    Able to rate subjective pain?  yes  -CJ      Pre-Treatment Pain Level  3  -CJ      Subjective Pain Comment  generalized   -CJ         Total Minutes    86076 - PT Therapeutic Exercise Minutes  44  -CJ         Exercise 1    Exercise Name 1  UBE; 2 fwd/2 back  -CJ      Time 1  4 minutes  -CJ         Exercise 2    Exercise Name 2  supine on blue foam roll  -CJ      Time 2  2 minutes  -CJ      Additional Comments  pectoral stretch  -CJ         Exercise 3    Exercise Name 3  supine on blue foam roll  -CJ      Reps 3  15  -CJ      Additional Comments  scap clock: BTB  -CJ         Exercise 4    Exercise Name 4  supine on blue foam roll  -CJ      Reps 4  20  -CJ      Additional Comments  B ER: BTB  -CJ         Exercise 5    Exercise Name 5  SL upper trunk rotation  -CJ      Reps 5  2 B  -CJ      Time 5  30sec  -CJ         Exercise 6    Exercise Name 6  PPT with MIP  -CJ      Reps 6  10  -CJ      Time 6  10 sec  -CJ         Exercise 7    Exercise Name 7  Bridges, BLE on green ball  -CJ      Reps 7  10   -CJ      Time 7  5 sec hold  -CJ      Additional Comments  gluteal squeeze, use hands to engage upper back  -CJ         Exercise 8    Exercise Name 8  seated lat pull, BTB  -CJ      Sets 8  2  -CJ      Reps 8  10  -CJ         Exercise 11    Exercise Name 11  resisted sidestepping  -CJ      Additional Comments  next session  -CJ         Exercise 12    Exercise Name 12  Doorway lat stretch  -CJ      Cueing 12  Tactile;Demo  -CJ      Reps 12  2  -CJ      Time 12  20 Sec  -CJ         Exercise 13    Exercise Name 13  B leg press  -CJ      Reps 13  20, 110lbs  -CJ      Time 13  slow and controlled  -CJ      Additional Comments  do not hyperextend knees  -CJ         Exercise 14    Exercise Name 14  serratus punches on blue noodle  -CJ      Reps 14  20  -CJ      Additional Comments  #4  -CJ         Exercise 15    Exercise Name 15  B clamshells  -CJ       Reps 15  20  -CJ      Time 15  3 sec open/close  -CJ      Additional Comments  nice and slow  -CJ         Exercise 16    Exercise Name 16  Prone supermans  -      Reps 16  10  -CJ      Time 16  TA contraction each time  -CJ         Exercise 17    Exercise Name 17  Prayer stretch after supermans  -      Reps 17  1  -CJ      Time 17  45sec  -CJ         Exercise 18    Exercise Name 18  Bird/dog, small range with dowel on low back  -      Cueing 18  Demo  -CJ      Reps 18  5 ea  -CJ        User Key  (r) = Recorded By, (t) = Taken By, (c) = Cosigned By    Initials Name Provider Type     Abdelrahman Walsh, PT Physical Therapist                                          Time Calculation:   Start Time: 1530  Stop Time: 1615  Time Calculation (min): 45 min  Total Timed Code Minutes- PT: 45 minute(s)  Therapy Charges for Today     Code Description Service Date Service Provider Modifiers Qty    57800688629 HC PT THER PROC EA 15 MIN 1/13/2020 Abdelrahman Walsh, PT GP 3                    Abdelrahman Walsh PT  1/13/2020

## 2020-01-17 ENCOUNTER — TELEPHONE (OUTPATIENT)
Dept: SLEEP MEDICINE | Facility: HOSPITAL | Age: 45
End: 2020-01-17

## 2020-01-17 NOTE — TELEPHONE ENCOUNTER
Spoke to pt about sleep study results.  Sending orders to [facility] with a f/u appt scheduled on March 4 @ 3:45 pm with Dr Watters.  CV

## 2020-01-22 ENCOUNTER — HOSPITAL ENCOUNTER (OUTPATIENT)
Dept: PHYSICAL THERAPY | Facility: HOSPITAL | Age: 45
Setting detail: THERAPIES SERIES
Discharge: HOME | End: 2020-01-22

## 2020-01-22 DIAGNOSIS — M54.50 CHRONIC BILATERAL LOW BACK PAIN WITHOUT SCIATICA: ICD-10-CM

## 2020-01-22 DIAGNOSIS — G89.29 CHRONIC BILATERAL LOW BACK PAIN WITHOUT SCIATICA: ICD-10-CM

## 2020-01-22 DIAGNOSIS — G89.29 CHRONIC RIGHT SHOULDER PAIN: Primary | ICD-10-CM

## 2020-01-22 DIAGNOSIS — M25.511 CHRONIC RIGHT SHOULDER PAIN: Primary | ICD-10-CM

## 2020-01-22 DIAGNOSIS — M79.601 RIGHT ARM PAIN: ICD-10-CM

## 2020-01-22 PROCEDURE — 97110 THERAPEUTIC EXERCISES: CPT

## 2020-01-23 NOTE — THERAPY TREATMENT NOTE
Outpatient Physical Therapy Ortho Treatment Note  [facility]     Patient Name: Jocelyn Reyes  : 1975  MRN: [id number]  Today's Date: 2020      Visit Date: 2020    Visit Dx:    ICD-10-CM ICD-9-CM   1. Chronic right shoulder pain M25.511 719.41    G89.29 338.29   2. Right arm pain M79.601 729.5   3. Chronic bilateral low back pain without sciatica M54.5 724.2    G89.29 338.29       Patient Active Problem List   Diagnosis   • Apnea, sleep   • Factor V Leiden (CMS/[facility])   • History of DVT (deep vein thrombosis)        Past Medical History:   Diagnosis Date   • DVT (deep venous thrombosis) (CMS/[facility])        • Factor 5 Leiden mutation, heterozygous (CMS/[facility])         Past Surgical History:   Procedure Laterality Date   • BREAST SURGERY Bilateral 2009    breast reduction   • COLONOSCOPY N/A 2016    Procedure: COLONOSCOPY to cecum / TI;  Surgeon: Ulisses Yousif MD;  Location: [facility] ENDOSCOPY;  Service:    • COLONOSCOPY W/ POLYPECTOMY     • ENDOMETRIAL ABLATION     • ENDOSCOPY N/A 2016    Procedure: ESOPHAGOGASTRODUODENOSCOPY;  Surgeon: Ulisses Yousif MD;  Location: [facility] ENDOSCOPY;  Service:    • LAPAROSCOPIC TUBAL LIGATION     • TONSILLECTOMY     • TUBAL ABDOMINAL LIGATION                         PT Assessment/Plan     Row Name 20 1655          PT Assessment    Assessment Comments  Pt reports slight flare up of R cervical/shoulder pain today, however progressing well with strengthening program. Pt able to modify work environment with increased frequency and tolerated added exercises today including reverse clamshells and quadruped hip circles. Pt notes fatigue at end of session and would benefit from continued strenghtening.  -CN        PT Plan    PT Plan Comments  Consider monster walk next visit.   -CN       User Key  (r) = Recorded By, (t) = Taken By, (c) = Cosigned By    Initials Name Provider Type    Ashley Johnson, PT Physical Therapist             OP Exercises     Row Name 01/22/20 1500             Subjective Comments    Subjective Comments  It feels ok today, it is  just up through the right side of my neck. The chest is better, I only feel it when I stretch back but no burning at rest.   -CN         Subjective Pain    Able to rate subjective pain?  yes  -CN      Pre-Treatment Pain Level  5  -CN         Total Minutes    38355 - PT Therapeutic Exercise Minutes  43  -CN         Exercise 1    Exercise Name 1  Nustep with UEs and TA  -CN      Cueing 1  Demo  -CN      Time 1  5 min  -CN         Exercise 2    Exercise Name 2  supine on blue foam roll  -CN      Time 2  2 minutes  -CN      Additional Comments  pectoral stretch  -CN         Exercise 3    Exercise Name 3  supine on blue foam roll  -CN      Reps 3  15  -CN      Additional Comments  scap clock: BTB  -CN         Exercise 4    Exercise Name 4  supine on blue foam roll  -CN      Reps 4  20  -CN      Additional Comments  B ER: BTB  -CN         Exercise 5    Exercise Name 5  SL upper trunk rotation  -CN      Reps 5  2 B  -CN      Time 5  30sec  -CN         Exercise 7    Exercise Name 7  Bridges, BLE on green ball  -CN      Reps 7  10   -CN      Time 7  5 sec hold  -CN         Exercise 9    Exercise Name 9  Reverse clamshells  -CN      Cueing 9  Demo;Verbal  -CN      Reps 9  15 B  -CN         Exercise 11    Exercise Name 11  resisted sidestepping  -CN      Cueing 11  Demo  -CN      Reps 11  2 laps, 15'  -CN      Additional Comments  GtB  -CN         Exercise 13    Exercise Name 13  B leg press  -CN      Reps 13  20, 110lbs  -CN      Time 13  slow and controlled  -CN      Additional Comments  do not hyperextend knees  -CN         Exercise 14    Exercise Name 14  serratus punches on blue noodle  -CN      Reps 14  20  -CN      Additional Comments  4#  -CN         Exercise 15    Exercise Name 15  B clamshells  -CN      Reps 15  20  -CN      Time 15  3 sec open/close  -CN      Additional Comments  nice and  slow  -CN         Exercise 16    Exercise Name 16  Prone supermans  -CN      Reps 16  10  -CN      Time 16  TA contraction each time  -CN         Exercise 18    Exercise Name 18  Bird/dog, small range with dowel on low back  -CN      Cueing 18  Demo  -CN      Reps 18  5 ea  -CN         Exercise 19    Exercise Name 19  Quadruped with 1 leg hip ext and small circles  -CN      Cueing 19  Demo;Verbal  -CN      Reps 19  5 CW and CCW B  -CN        User Key  (r) = Recorded By, (t) = Taken By, (c) = Cosigned By    Initials Name Provider Type    Ashley Johnson, MEGHANN Physical Therapist                       PT OP Goals     Row Name 01/22/20 1600          PT Short Term Goals    STG Date to Achieve  11/25/19  -CN     STG 1  Pt will demonstrate understanding and compliance with initial HEP.  -CN     STG 1 Progress  Met  -CN     STG 2  Pt will report 0 occurences of waking from sleep to R pectoral region pain.  -CN     STG 2 Progress  Ongoing  -CN     STG 3  Pt will tolerate shoulder row exercise without inc R sided neck or anterior chest pain.   -CN     STG 3 Progress  Met  -CN        Long Term Goals    LTG Date to Achieve  12/11/19  -CN     LTG 1  Pt will demonstrate improved R UE AROM to 150 deg or better flexion/abduction without inc R shoulder symptoms.  -CN     LTG 1 Progress  Progressing  -CN     LTG 2  Pt will demonstrate improved RUE strength to 4/5 or better flexion, abduction, ER without inc R shoulder symptoms.  -CN     LTG 2 Progress  Progressing  -CN     LTG 3  Pt will report pain at worse reduced from 8/10 to 5/10 or better in R shoulder.  -CN     LTG 3 Progress  Progressing  -CN       User Key  (r) = Recorded By, (t) = Taken By, (c) = Cosigned By    Initials Name Provider Type    Ashley Johnson, PT Physical Therapist          Therapy Education  Given: Symptoms/condition management, HEP  Program: Reinforced  How Provided: Verbal  Provided to: Patient  Level of Understanding: Teach back  education performed, Verbalized, Demonstrated              Time Calculation:   Start Time: 1547  Stop Time: 1630  Time Calculation (min): 43 min  Therapy Charges for Today     Code Description Service Date Service Provider Modifiers Qty    76451353666  PT THER PROC EA 15 MIN 1/22/2020 Ashley Conte, PT GP 3                    Ashley Conte, PT  1/22/2020

## 2020-01-29 ENCOUNTER — HOSPITAL ENCOUNTER (OUTPATIENT)
Dept: PHYSICAL THERAPY | Facility: HOSPITAL | Age: 45
Setting detail: THERAPIES SERIES
Discharge: HOME | End: 2020-01-29

## 2020-01-29 DIAGNOSIS — M54.50 CHRONIC BILATERAL LOW BACK PAIN WITHOUT SCIATICA: ICD-10-CM

## 2020-01-29 DIAGNOSIS — G89.29 CHRONIC RIGHT SHOULDER PAIN: Primary | ICD-10-CM

## 2020-01-29 DIAGNOSIS — M79.601 RIGHT ARM PAIN: ICD-10-CM

## 2020-01-29 DIAGNOSIS — M25.511 CHRONIC RIGHT SHOULDER PAIN: Primary | ICD-10-CM

## 2020-01-29 DIAGNOSIS — G89.29 CHRONIC BILATERAL LOW BACK PAIN WITHOUT SCIATICA: ICD-10-CM

## 2020-01-29 PROCEDURE — 97110 THERAPEUTIC EXERCISES: CPT | Performed by: PHYSICAL THERAPIST

## 2020-01-29 NOTE — THERAPY TREATMENT NOTE
"    Outpatient Physical Therapy Ortho Progress Note  [facility]     Patient Name: Jocelyn Reyes  : 1975  MRN: [id number]  Today's Date: 2020      Visit Date: 2020    Visit Dx:    ICD-10-CM ICD-9-CM   1. Chronic right shoulder pain M25.511 719.41    G89.29 338.29   2. Right arm pain M79.601 729.5   3. Chronic bilateral low back pain without sciatica M54.5 724.2    G89.29 338.29       Patient Active Problem List   Diagnosis   • Apnea, sleep   • Factor V Leiden (CMS/[facility])   • History of DVT (deep vein thrombosis)        Past Medical History:   Diagnosis Date   • DVT (deep venous thrombosis) (CMS/[facility])        • Factor 5 Leiden mutation, heterozygous (CMS/[facility])         Past Surgical History:   Procedure Laterality Date   • BREAST SURGERY Bilateral 2009    breast reduction   • COLONOSCOPY N/A 2016    Procedure: COLONOSCOPY to cecum / TI;  Surgeon: Ulisses Yousif MD;  Location: [facility] ENDOSCOPY;  Service:    • COLONOSCOPY W/ POLYPECTOMY     • ENDOMETRIAL ABLATION     • ENDOSCOPY N/A 2016    Procedure: ESOPHAGOGASTRODUODENOSCOPY;  Surgeon: Ulisses Yousif MD;  Location: [facility] ENDOSCOPY;  Service:    • LAPAROSCOPIC TUBAL LIGATION     • TONSILLECTOMY     • TUBAL ABDOMINAL LIGATION                         PT Assessment/Plan     Row Name 20 1648          PT Assessment    Assessment Comments  Patient reporting overall 60%  improvement in lumbar and cervical complaints since initiating therapy. Pituitary tumor causing a hormonal imbalance the last few days leaving her overall \"not feeling well.\" Addressing with MD. Continued with strengthening program, focus on extensor strengthening.  Reporting decreased frequency and intensity of lumbar spasms compared to weeks prior. Overall, making nice progress. Will work towards discharge to self management in the coming weeks.   -CJ        PT Plan    PT Plan Comments  consider progression to bridge/lat pull next session and " Anti-rotation core work  -CJ       User Key  (r) = Recorded By, (t) = Taken By, (c) = Cosigned By    Initials Name Provider Type    CJ Abdelrahman Walsh, PT Physical Therapist            OP Exercises     Row Name 01/29/20 1500             Subjective Comments    Subjective Comments  I have a pituitary tumor and I recently stopped the medication but I noticed the last two days it has been bad. I feel crappy all over. My neck has improved 60% and my low back has improved 50-60%. I am getting twinges like it is going to spasm but it doesnt.  -CJ         Subjective Pain    Able to rate subjective pain?  yes  -CJ      Pre-Treatment Pain Level  3  -CJ         Total Minutes    07938 - PT Therapeutic Exercise Minutes  45  -CJ         Exercise 1    Exercise Name 1  UBE; 2 fwd/2 back  -CJ      Time 1  4 minutes  -CJ         Exercise 2    Exercise Name 2  supine on blue foam roll  -CJ      Time 2  2 minutes  -CJ      Additional Comments  pectoral stretch  -CJ         Exercise 3    Exercise Name 3  supine on blue foam roll  -CJ      Reps 3  10  -CJ      Additional Comments  scap clock:BTB  -CJ         Exercise 4    Exercise Name 4  supine on blue foam roll  -CJ      Reps 4  15  -CJ      Additional Comments  B ER: BTB  -CJ         Exercise 5    Exercise Name 5  SL upper trunk rotation  -CJ      Reps 5  2 B  -CJ      Time 5  30sec  -CJ         Exercise 6    Exercise Name 6  add anti rotation next session  -CJ         Exercise 7    Exercise Name 7  Bridges, BLE on green ball  -CJ      Reps 7  10   -CJ      Time 7  10 sec hold  -CJ         Exercise 8    Exercise Name 8  seated lat pull,  -CJ      Sets 8  2  -CJ      Reps 8  10  -CJ      Time 8  3 plates on TS  -CJ         Exercise 9    Exercise Name 9  seated ball rollout  -CJ      Sets 9  fwd and each lateral  -CJ      Reps 9  5 ea direction  -CJ         Exercise 10    Exercise Name 10  Rows, 3 plates on TS  -CJ      Reps 10  15  -CJ         Exercise 11    Exercise Name 11  radial and  ulnar nerve glide for HEP  -CJ         Exercise 12    Exercise Name 12  Doorway lat stretch  -CJ      Cueing 12  Tactile;Demo  -CJ      Reps 12  2  -CJ      Time 12  20 Sec  -CJ         Exercise 13    Exercise Name 13  B leg press  -CJ      Reps 13  20, 120lbs  -CJ      Time 13  slow and controlled  -CJ         Exercise 14    Exercise Name 14  serratus punches on blue noodle  -CJ      Reps 14  20  -CJ      Additional Comments  #5  -CJ         Exercise 15    Exercise Name 15  B clamshells  -CJ      Sets 15  RTB  -CJ      Reps 15  10 regular/10 elevated  -CJ      Time 15  3 sec open/close  -CJ         Exercise 16    Exercise Name 16  bridge w/ lat pull next session  -CJ         Exercise 17    Exercise Name 17  Prayer stretch after supermans  -CJ      Reps 17  1  -CJ      Time 17  45sec  -CJ         Exercise 18    Exercise Name 18  Bird/dog, small range with dowel on low back  -CJ      Cueing 18  Demo  -CJ      Reps 18  8   -CJ      Time 18  hold 5 sec  -CJ         Exercise 19    Exercise Name 19  Quadruped with 1 leg hip ext and small circles  -CJ      Cueing 19  Demo;Verbal  -CJ      Reps 19  5 CW and CCW B  -CJ        User Key  (r) = Recorded By, (t) = Taken By, (c) = Cosigned By    Initials Name Provider Type    CJ Abdelrahman Walsh, PT Physical Therapist                                          Time Calculation:   Start Time: 1530  Stop Time: 1615  Time Calculation (min): 45 min  Total Timed Code Minutes- PT: 45 minute(s)  Therapy Charges for Today     Code Description Service Date Service Provider Modifiers Qty    32512650235 HC PT THER PROC EA 15 MIN 1/29/2020 Abdelrahman Walsh, PT GP 3                    Abdelrahman Walsh PT  1/29/2020

## 2020-01-30 ENCOUNTER — LAB (OUTPATIENT)
Dept: LAB | Facility: HOSPITAL | Age: 45
End: 2020-01-30

## 2020-01-30 ENCOUNTER — TRANSCRIBE ORDERS (OUTPATIENT)
Dept: LAB | Facility: HOSPITAL | Age: 45
End: 2020-01-30

## 2020-01-30 DIAGNOSIS — E22.1 HYPERPROLACTINEMIA (HCC): Primary | ICD-10-CM

## 2020-01-30 DIAGNOSIS — N64.3 GALACTORRHEA: ICD-10-CM

## 2020-01-30 DIAGNOSIS — Z86.39 PERSONAL HISTORY OF NUTRITIONAL DEFICIENCY: ICD-10-CM

## 2020-01-30 DIAGNOSIS — R53.83 FATIGUE, UNSPECIFIED TYPE: ICD-10-CM

## 2020-01-30 DIAGNOSIS — E22.1 HYPERPROLACTINEMIA (HCC): ICD-10-CM

## 2020-01-30 LAB
25(OH)D3 SERPL-MCNC: 27 NG/ML (ref 30–100)
ALBUMIN SERPL-MCNC: 4.5 G/DL (ref 3.5–5.2)
ALBUMIN/GLOB SERPL: 2 G/DL
ALP SERPL-CCNC: 53 U/L (ref 39–117)
ALT SERPL W P-5'-P-CCNC: 16 U/L (ref 1–33)
ANION GAP SERPL CALCULATED.3IONS-SCNC: 13 MMOL/L (ref 5–15)
AST SERPL-CCNC: 17 U/L (ref 1–32)
BILIRUB SERPL-MCNC: 0.5 MG/DL (ref 0.2–1.2)
BUN BLD-MCNC: 12 MG/DL (ref 6–20)
BUN/CREAT SERPL: 17.1 (ref 7–25)
CALCIUM SPEC-SCNC: 9.2 MG/DL (ref 8.6–10.5)
CHLORIDE SERPL-SCNC: 103 MMOL/L (ref 98–107)
CO2 SERPL-SCNC: 25 MMOL/L (ref 22–29)
CREAT BLD-MCNC: 0.7 MG/DL (ref 0.57–1)
DEPRECATED RDW RBC AUTO: 42.8 FL (ref 37–54)
ERYTHROCYTE [DISTWIDTH] IN BLOOD BY AUTOMATED COUNT: 13.1 % (ref 12.3–15.4)
FERRITIN SERPL-MCNC: 162 NG/ML (ref 13–150)
GFR SERPL CREATININE-BSD FRML MDRD: 90 ML/MIN/1.73
GLOBULIN UR ELPH-MCNC: 2.2 GM/DL
GLUCOSE BLD-MCNC: 96 MG/DL (ref 65–99)
HCT VFR BLD AUTO: 43.4 % (ref 34–46.6)
HGB BLD-MCNC: 14.6 G/DL (ref 12–15.9)
IRON 24H UR-MRATE: 125 MCG/DL (ref 37–145)
IRON SATN MFR SERPL: 37 % (ref 20–50)
MCH RBC QN AUTO: 30.3 PG (ref 26.6–33)
MCHC RBC AUTO-ENTMCNC: 33.6 G/DL (ref 31.5–35.7)
MCV RBC AUTO: 90 FL (ref 79–97)
PLATELET # BLD AUTO: 264 10*3/MM3 (ref 140–450)
PMV BLD AUTO: 8.9 FL (ref 6–12)
POTASSIUM BLD-SCNC: 4.3 MMOL/L (ref 3.5–5.2)
PROLACTIN SERPL-MCNC: 87.7 NG/ML (ref 4.79–23.3)
PROT SERPL-MCNC: 6.7 G/DL (ref 6–8.5)
RBC # BLD AUTO: 4.82 10*6/MM3 (ref 3.77–5.28)
SODIUM BLD-SCNC: 141 MMOL/L (ref 136–145)
T4 FREE SERPL-MCNC: 1.16 NG/DL (ref 0.93–1.7)
TIBC SERPL-MCNC: 338 MCG/DL (ref 298–536)
TRANSFERRIN SERPL-MCNC: 227 MG/DL (ref 200–360)
TSH SERPL DL<=0.05 MIU/L-ACNC: 1.41 UIU/ML (ref 0.27–4.2)
VIT B12 BLD-MCNC: 372 PG/ML (ref 211–946)
WBC NRBC COR # BLD: 8.91 10*3/MM3 (ref 3.4–10.8)

## 2020-01-30 PROCEDURE — 84443 ASSAY THYROID STIM HORMONE: CPT

## 2020-01-30 PROCEDURE — 82728 ASSAY OF FERRITIN: CPT

## 2020-01-30 PROCEDURE — 84146 ASSAY OF PROLACTIN: CPT

## 2020-01-30 PROCEDURE — 82306 VITAMIN D 25 HYDROXY: CPT

## 2020-01-30 PROCEDURE — 82607 VITAMIN B-12: CPT

## 2020-01-30 PROCEDURE — 36415 COLL VENOUS BLD VENIPUNCTURE: CPT

## 2020-01-30 PROCEDURE — 80053 COMPREHEN METABOLIC PANEL: CPT

## 2020-01-30 PROCEDURE — 85027 COMPLETE CBC AUTOMATED: CPT

## 2020-01-30 PROCEDURE — 84439 ASSAY OF FREE THYROXINE: CPT

## 2020-01-30 PROCEDURE — 83540 ASSAY OF IRON: CPT

## 2020-01-30 PROCEDURE — 84466 ASSAY OF TRANSFERRIN: CPT

## 2020-01-31 ENCOUNTER — TELEPHONE (OUTPATIENT)
Dept: SLEEP MEDICINE | Facility: HOSPITAL | Age: 45
End: 2020-01-31

## 2020-01-31 NOTE — TELEPHONE ENCOUNTER
Patient called to complain that [facility] was not contacting her and she has called on more than one occasion.  She asked for a suggestion for an alternative DME so [facility] was recommended to her.  Leif orders today and patient is awaiting follow up.

## 2020-02-03 ENCOUNTER — APPOINTMENT (OUTPATIENT)
Dept: PHYSICAL THERAPY | Facility: HOSPITAL | Age: 45
End: 2020-02-03

## 2020-02-07 ENCOUNTER — HOSPITAL ENCOUNTER (OUTPATIENT)
Dept: PHYSICAL THERAPY | Facility: HOSPITAL | Age: 45
Setting detail: THERAPIES SERIES
Discharge: HOME | End: 2020-02-07

## 2020-02-07 DIAGNOSIS — M25.511 CHRONIC RIGHT SHOULDER PAIN: Primary | ICD-10-CM

## 2020-02-07 DIAGNOSIS — M79.601 RIGHT ARM PAIN: ICD-10-CM

## 2020-02-07 DIAGNOSIS — G89.29 CHRONIC RIGHT SHOULDER PAIN: Primary | ICD-10-CM

## 2020-02-07 DIAGNOSIS — G89.29 CHRONIC BILATERAL LOW BACK PAIN WITHOUT SCIATICA: ICD-10-CM

## 2020-02-07 DIAGNOSIS — M54.50 CHRONIC BILATERAL LOW BACK PAIN WITHOUT SCIATICA: ICD-10-CM

## 2020-02-07 PROCEDURE — 97110 THERAPEUTIC EXERCISES: CPT | Performed by: PHYSICAL THERAPIST

## 2020-02-07 NOTE — THERAPY TREATMENT NOTE
Outpatient Physical Therapy Ortho Treatment Note  [facility]     Patient Name: Jocelyn eRyes  : 1975  MRN: [id number]  Today's Date: 2020      Visit Date: 2020    Visit Dx:    ICD-10-CM ICD-9-CM   1. Chronic right shoulder pain M25.511 719.41    G89.29 338.29   2. Right arm pain M79.601 729.5   3. Chronic bilateral low back pain without sciatica M54.5 724.2    G89.29 338.29       Patient Active Problem List   Diagnosis   • Apnea, sleep   • Factor V Leiden (CMS/[facility])   • History of DVT (deep vein thrombosis)        Past Medical History:   Diagnosis Date   • DVT (deep venous thrombosis) (CMS/[facility])        • Factor 5 Leiden mutation, heterozygous (CMS/[facility])         Past Surgical History:   Procedure Laterality Date   • BREAST SURGERY Bilateral 2009    breast reduction   • COLONOSCOPY N/A 2016    Procedure: COLONOSCOPY to cecum / TI;  Surgeon: Ulisses Yousif MD;  Location: [facility] ENDOSCOPY;  Service:    • COLONOSCOPY W/ POLYPECTOMY     • ENDOMETRIAL ABLATION     • ENDOSCOPY N/A 2016    Procedure: ESOPHAGOGASTRODUODENOSCOPY;  Surgeon: Ulisses Yousif MD;  Location: [facility] ENDOSCOPY;  Service:    • LAPAROSCOPIC TUBAL LIGATION     • TONSILLECTOMY     • TUBAL ABDOMINAL LIGATION                         PT Assessment/Plan     Row Name 20 1256          PT Assessment    Assessment Comments  Overall, improved 60-70% per her report. Slight discomfort in R pectoral tissue near sternal border secondary to multiple carotid dopplers performed prior to attending session. One session remaining at this time. WIll determine needs for further therapy vs. discharge to self management  -GATITO       User Key  (r) = Recorded By, (t) = Taken By, (c) = Cosigned By    Initials Name Provider Type    Abdelrahman Garcia, PT Physical Therapist            OP Exercises     Row Name 20 1200 20 1100          Subjective Comments    Subjective Comments  --  I have a little twinge in the  chest when performing the carotid dopplar. Low back is doing good.  The stretches helped and I am not having the nerve pain in my thumb anymore.  -CJ        Subjective Pain    Able to rate subjective pain?  --  yes  -CJ     Pre-Treatment Pain Level  --  3  -CJ        Total Minutes    32756 - PT Therapeutic Exercise Minutes  --  45  -CJ        Exercise 1    Exercise Name 1  --  UBE; 2 fwd/2 back  -CJ     Time 1  --  4 minutes  -CJ        Exercise 2    Exercise Name 2  --  supine on blue foam roll  -CJ     Time 2  --  2 minutes  -CJ        Exercise 3    Exercise Name 3  --  supine on blue foam roll  -CJ     Reps 3  --  10  -CJ     Time 3  --  scap clock: BTB  -CJ        Exercise 4    Exercise Name 4  --  supine on blue foam roll  -CJ     Reps 4  --  15  -CJ     Time 4  --  B ER: BTB  -CJ        Exercise 5    Exercise Name 5  --  SL upper trunk rotation  -CJ     Reps 5  --  2 B  -CJ     Time 5  --  30sec  -CJ        Exercise 6    Exercise Name 6  anti rotation, RTB B  -CJ  --     Reps 6  15  -CJ  --        Exercise 7    Exercise Name 7  --  Bridges, BLE on green ball  -CJ     Reps 7  --  10   -CJ     Time 7  --  10 sec hold  -CJ        Exercise 8    Exercise Name 8  seated lat pull,  -CJ  --     Sets 8  2  -CJ  --     Reps 8  10  -CJ  --     Time 8  3 plates on TS  -CJ  --        Exercise 10    Exercise Name 10  Rows, 3 plates on TS  -CJ  --     Reps 10  15  -CJ  --        Exercise 12    Exercise Name 12  Doorway lat stretch  -CJ  --     Cueing 12  Tactile;Demo  -CJ  --     Reps 12  2  -CJ  --     Time 12  30 sec  -CJ  --        Exercise 14    Exercise Name 14  --  serratus punches on blue noodle  -CJ     Reps 14  --  20  -CJ        Exercise 15    Exercise Name 15  B clamshells  -CJ  --     Sets 15  RTB  -CJ  --     Reps 15  10 regular/10 elevated  -CJ  --     Time 15  3 sec open/close  -CJ  --        Exercise 16    Exercise Name 16  bridge w/ lat pull  -CJ  --     Reps 16  8 B  -CJ  --     Time 16  RTB  -CJ  --        User Key  (r) = Recorded By, (t) = Taken By, (c) = Cosigned By    Initials Name Provider Type    CJ Abdelrahman Walsh, PT Physical Therapist                                          Time Calculation:   Start Time: 1130  Stop Time: 1215  Time Calculation (min): 45 min  Total Timed Code Minutes- PT: 45 minute(s)  Therapy Charges for Today     Code Description Service Date Service Provider Modifiers Qty    54392638930 HC PT THER PROC EA 15 MIN 2/7/2020 Abdelrahman Walsh, PT GP 3                    Abdelrahman Walsh PT  2/7/2020

## 2020-02-12 ENCOUNTER — APPOINTMENT (OUTPATIENT)
Dept: PHYSICAL THERAPY | Facility: HOSPITAL | Age: 45
End: 2020-02-12

## 2020-03-04 ENCOUNTER — APPOINTMENT (OUTPATIENT)
Dept: SLEEP MEDICINE | Facility: HOSPITAL | Age: 45
End: 2020-03-04

## 2020-03-16 ENCOUNTER — HOSPITAL ENCOUNTER (OUTPATIENT)
Dept: PHYSICAL THERAPY | Facility: HOSPITAL | Age: 45
Setting detail: THERAPIES SERIES
Discharge: HOME | End: 2020-03-16

## 2020-03-16 DIAGNOSIS — G89.29 CHRONIC BILATERAL LOW BACK PAIN WITHOUT SCIATICA: ICD-10-CM

## 2020-03-16 DIAGNOSIS — G89.29 CHRONIC RIGHT SHOULDER PAIN: Primary | ICD-10-CM

## 2020-03-16 DIAGNOSIS — M54.50 CHRONIC BILATERAL LOW BACK PAIN WITHOUT SCIATICA: ICD-10-CM

## 2020-03-16 DIAGNOSIS — M25.511 CHRONIC RIGHT SHOULDER PAIN: Primary | ICD-10-CM

## 2020-03-16 DIAGNOSIS — M79.601 RIGHT ARM PAIN: ICD-10-CM

## 2020-03-16 PROCEDURE — 97110 THERAPEUTIC EXERCISES: CPT | Performed by: PHYSICAL THERAPIST

## 2020-03-17 NOTE — THERAPY DISCHARGE NOTE
Outpatient Physical Therapy Ortho Treatment Note/Discharge Summary  [facility]     Patient Name: Jocelyn Reyes  : 1975  MRN: [id number]  Today's Date: 3/17/2020      Visit Date: 2020    Visit Dx:    ICD-10-CM ICD-9-CM   1. Chronic right shoulder pain M25.511 719.41    G89.29 338.29   2. Right arm pain M79.601 729.5   3. Chronic bilateral low back pain without sciatica M54.5 724.2    G89.29 338.29       Patient Active Problem List   Diagnosis   • Apnea, sleep   • Factor V Leiden (CMS/[facility])   • History of DVT (deep vein thrombosis)        Past Medical History:   Diagnosis Date   • DVT (deep venous thrombosis) (CMS/[facility])        • Factor 5 Leiden mutation, heterozygous (CMS/[facility])         Past Surgical History:   Procedure Laterality Date   • BREAST SURGERY Bilateral     breast reduction   • COLONOSCOPY N/A 2016    Procedure: COLONOSCOPY to cecum / TI;  Surgeon: Ulisses Yousif MD;  Location: [facility] ENDOSCOPY;  Service:    • COLONOSCOPY W/ POLYPECTOMY     • ENDOMETRIAL ABLATION     • ENDOSCOPY N/A 2016    Procedure: ESOPHAGOGASTRODUODENOSCOPY;  Surgeon: Ulisses Yousif MD;  Location: [facility] ENDOSCOPY;  Service:    • LAPAROSCOPIC TUBAL LIGATION     • TONSILLECTOMY     • TUBAL ABDOMINAL LIGATION                                 OP Exercises     Row Name 20 1500             Subjective Comments    Subjective Comments  Back has been ok. No flare ups. Neck/shoulder depends on how much scanning I am doing.   -CJ         Subjective Pain    Able to rate subjective pain?  yes  -CJ      Pre-Treatment Pain Level  3  -CJ         Total Minutes    95295 - PT Therapeutic Exercise Minutes  45  -CJ         Exercise 1    Exercise Name 1  elliptical, L3  -CJ      Time 1  4 min with TA  -CJ         Exercise 2    Exercise Name 2  supine on blue foam roll  -CJ      Time 2  2 minutes  -CJ         Exercise 3    Exercise Name 3  supine on blue foam roll  -CJ      Reps 3  10  -CJ      Time  3  scap clock: BTB  -CJ         Exercise 4    Exercise Name 4  supine on blue foam roll  -CJ      Reps 4  15  -CJ      Time 4  B ER: BTB  -CJ         Exercise 6    Exercise Name 6  anti rotation, RTB B  -CJ      Reps 6  15  -CJ         Exercise 7    Exercise Name 7  Bridges, BLE on green ball  -CJ      Reps 7  10   -CJ      Time 7  10 sec hold  -CJ         Exercise 8    Exercise Name 8  seated lat pull,  -CJ      Sets 8  2  -CJ      Reps 8  10  -CJ      Time 8  4 plates on TS  -CJ         Exercise 10    Exercise Name 10  Rows, 3 plates on TS  -CJ      Reps 10  15  -CJ         Exercise 11    Exercise Name 11  HS stretch on step  -CJ      Reps 11  2 B  -CJ      Time 11  30 sec  -CJ         Exercise 12    Exercise Name 12  Doorway lat stretch  -CJ      Cueing 12  Tactile;Demo  -CJ      Reps 12  2 B  -CJ      Time 12  20 sec  -CJ         Exercise 14    Exercise Name 14  serratus punches on blue noodle  -CJ      Reps 14  20  -CJ      Additional Comments  #5  -CJ         Exercise 15    Exercise Name 15  B clamshells  -CJ      Sets 15  GTB  -CJ      Reps 15  10 regular/10 elevated  -CJ      Time 15  3 sec open/close  -CJ         Exercise 17    Exercise Name 17  Hip flexor stretch  -CJ      Reps 17  2 B  -CJ      Time 17  30 sec  -CJ         Exercise 18    Exercise Name 18  Bird/dog, small range with dowel on low back  -CJ      Cueing 18  Demo  -CJ      Reps 18  8   -CJ      Time 18  at 45 deg angle for UE/LE  -CJ         Exercise 20    Exercise Name 20  B calf stretch  -CJ      Reps 20  2  -CJ      Time 20  30 sec  -CJ        User Key  (r) = Recorded By, (t) = Taken By, (c) = Cosigned By    Initials Name Provider Type    Abdelrahman Garcia S, PT Physical Therapist                         PT OP Goals     Row Name 03/16/20 1500          PT Short Term Goals    STG Date to Achieve  11/25/19  -CJ     STG 1  Pt will demonstrate understanding and compliance with initial HEP.  -CJ     STG 1 Progress  Met  -CJ     STG 2  Pt will  report 0 occurences of waking from sleep to R pectoral region pain.  -     STG 2 Progress  Met  -     STG 3  Pt will tolerate shoulder row exercise without inc R sided neck or anterior chest pain.   -     STG 3 Progress  Met  -        Long Term Goals    LTG Date to Achieve  12/11/19  -     LTG 1  Pt will demonstrate improved R UE AROM to 150 deg or better flexion/abduction without inc R shoulder symptoms.  -     LTG 1 Progress  Met  -     LTG 2  Pt will demonstrate improved RUE strength to 4/5 or better flexion, abduction, ER without inc R shoulder symptoms.  -     LTG 2 Progress  Met  -     LTG 3  Pt will report pain at worse reduced from 8/10 to 5/10 or better in R shoulder.  -     LTG 3 Progress  Met  -       User Key  (r) = Recorded By, (t) = Taken By, (c) = Cosigned By    Initials Name Provider Type    Abdelrahman Garcia, PT Physical Therapist                         Time Calculation:   Start Time: 1530  Stop Time: 1615  Time Calculation (min): 45 min  Total Timed Code Minutes- PT: 45 minute(s)  Therapy Charges for Today     Code Description Service Date Service Provider Modifiers Qty    48119403520 HC PT THER PROC EA 15 MIN 3/16/2020 Abdelrahman Walsh, PT GP 3                OP PT Discharge Summary  Date of Discharge: 03/16/20  Reason for Discharge: Maximum functional potential achieved, Independent, All goals achieved  Outcomes Achieved: Able to achieve all goals within established timeline  Discharge Destination: Home with home program  Discharge Instructions/Additional Comments: Mrs. Reyes was seen for skilled therapy services to address neck/R shoulder pain and chronic low back pain. She has made significant improvements with respect to pain in both areas. She has been educated on a comprehensive HEP to self manage both conditions moving forward. She is agreeable to plan at this time. She plans to discharge.      Abdelrahman Walsh PT  3/17/2020

## 2020-04-07 ENCOUNTER — APPOINTMENT (OUTPATIENT)
Dept: SLEEP MEDICINE | Facility: HOSPITAL | Age: 45
End: 2020-04-07

## 2020-05-12 ENCOUNTER — OFFICE VISIT (OUTPATIENT)
Dept: INTERNAL MEDICINE | Facility: CLINIC | Age: 45
End: 2020-05-12

## 2020-05-12 VITALS
WEIGHT: 215 LBS | TEMPERATURE: 98.6 F | DIASTOLIC BLOOD PRESSURE: 80 MMHG | BODY MASS INDEX: 33.74 KG/M2 | OXYGEN SATURATION: 99 % | SYSTOLIC BLOOD PRESSURE: 130 MMHG | HEIGHT: 67 IN | HEART RATE: 74 BPM

## 2020-05-12 DIAGNOSIS — G47.33 OBSTRUCTIVE SLEEP APNEA SYNDROME: ICD-10-CM

## 2020-05-12 DIAGNOSIS — Z00.00 PE (PHYSICAL EXAM), ANNUAL: Primary | ICD-10-CM

## 2020-05-12 DIAGNOSIS — Z11.59 SCREENING FOR VIRAL DISEASE: ICD-10-CM

## 2020-05-12 DIAGNOSIS — M79.642 LEFT HAND PAIN: ICD-10-CM

## 2020-05-12 DIAGNOSIS — E22.1 HYPERPROLACTINEMIA (HCC): ICD-10-CM

## 2020-05-12 PROCEDURE — 99396 PREV VISIT EST AGE 40-64: CPT | Performed by: NURSE PRACTITIONER

## 2020-05-12 PROCEDURE — 36415 COLL VENOUS BLD VENIPUNCTURE: CPT | Performed by: NURSE PRACTITIONER

## 2020-05-12 NOTE — PROGRESS NOTES
Chaparro Reyes is a 44 y.o. female who presents for a physical exam.    She was experiencing discomfort and discharge from her right nipple last Fall, prolactin levels were elevated. She was started on Cabergoline but due to improvement was able to stop medication in December. However, due to an elevation she restarted medication in February. She does report discomfort has resolved.  She is using her CPAP for sleep apnea and is adjusting to the mouthpiece.       The following portions of the patient's history were reviewed and updated as appropriate: allergies, current medications, past social history and problem list.    Past Medical History:   Diagnosis Date   • DVT (deep venous thrombosis) (CMS/[facility])     2010   • Factor 5 Leiden mutation, heterozygous (CMS/[facility])          Current Outpatient Medications:   •  fexofenadine (ALLEGRA) 180 MG tablet, Take 180 mg by mouth as needed., Disp: , Rfl:   •  IBUPROFEN PO, Take  by mouth., Disp: , Rfl:   •  montelukast (SINGULAIR) 10 MG tablet, Take 1 tablet by mouth Every Night., Disp: 30 tablet, Rfl: 3  •  omeprazole OTC (PriLOSEC OTC) 20 MG EC tablet, Take 1 tablet by mouth Daily., Disp: 30 tablet, Rfl: 3  •  cabergoline (DOSTINEX) 0.5 MG tablet, Take 0.25 mg by mouth 2 (Two) Times a Week., Disp: , Rfl:     No Known Allergies    Review of Systems   Constitutional: Negative for activity change, appetite change, chills, diaphoresis, fatigue, fever and unexpected weight change.        Energy level has improved since last year   HENT: Positive for congestion, postnasal drip and tinnitus. Negative for dental problem, drooling, ear discharge, ear pain, facial swelling, hearing loss, mouth sores, nosebleeds, rhinorrhea, sinus pressure, sore throat and trouble swallowing.    Eyes: Negative for photophobia, pain, discharge, redness, itching and visual disturbance.        Wears glasses, no visual changes   Respiratory: Negative for apnea, cough, choking, chest tightness,  "shortness of breath and wheezing.    Cardiovascular: Positive for palpitations (improved ). Negative for chest pain and leg swelling.        No orthopnea, PND, LARIOS   Gastrointestinal: Negative for abdominal pain, blood in stool, constipation, diarrhea, nausea and vomiting.   Endocrine: Positive for cold intolerance. Negative for heat intolerance, polydipsia and polyuria.   Genitourinary: Negative for decreased urine volume, dysuria, enuresis, flank pain, frequency, hematuria and urgency.   Musculoskeletal: Positive for arthralgias (right shoulder pain improved with PT) and back pain. Negative for gait problem, joint swelling, myalgias, neck pain and neck stiffness.        C/o tenderness/stiffness in left thumb   Skin: Negative for color change and rash.        No hair changes, no nail changes   Allergic/Immunologic: Negative for environmental allergies, food allergies and immunocompromised state.   Neurological: Positive for dizziness, light-headedness and headaches. Negative for tremors, seizures, syncope, speech difficulty, weakness and numbness.   Hematological: Negative for adenopathy. Bruises/bleeds easily.   Psychiatric/Behavioral: Negative for agitation, confusion, decreased concentration, dysphoric mood, sleep disturbance and suicidal ideas. The patient is not nervous/anxious.        Objective   Vitals:    05/12/20 0838   BP: 130/80   BP Location: Left arm   Patient Position: Sitting   Cuff Size: Adult   Pulse: 74   Temp: 98.6 °F (37 °C)   TempSrc: Oral   SpO2: 99%   Weight: 97.5 kg (215 lb)   Height: 170.2 cm (67\")     Body mass index is 33.67 kg/m².  Physical Exam   Constitutional: She is oriented to person, place, and time. She appears well-developed and well-nourished. No distress.   HENT:   Right Ear: Hearing, tympanic membrane, external ear and ear canal normal.   Left Ear: Hearing, tympanic membrane, external ear and ear canal normal.   Nose: Right sinus exhibits no maxillary sinus tenderness and no " frontal sinus tenderness. Left sinus exhibits no maxillary sinus tenderness and no frontal sinus tenderness.   Eyes: Pupils are equal, round, and reactive to light. Conjunctivae, EOM and lids are normal.   Neck: Trachea normal and phonation normal. Neck supple. Normal carotid pulses and no JVD present. Carotid bruit is not present. No thyroid mass and no thyromegaly present.   Cardiovascular: Normal rate, regular rhythm, S1 normal and S2 normal. PMI is not displaced. Exam reveals no gallop and no friction rub.   No murmur heard.  Pulses:       Carotid pulses are 2+ on the right side, and 2+ on the left side.       Radial pulses are 2+ on the right side, and 2+ on the left side.        Dorsalis pedis pulses are 2+ on the right side, and 2+ on the left side.   Pulmonary/Chest: Effort normal and breath sounds normal. She has no wheezes. She has no rhonchi. She has no rales. Right breast exhibits no inverted nipple, no mass, no nipple discharge, no skin change and no tenderness. Left breast exhibits no inverted nipple, no mass, no nipple discharge, no skin change and no tenderness.   Abdominal: Soft. Normal appearance, normal aorta and bowel sounds are normal. She exhibits no abdominal bruit and no mass. There is no hepatosplenomegaly. There is no tenderness.   Musculoskeletal: Normal range of motion. She exhibits no edema or tenderness.        Hands:  Lymphadenopathy:     She has no cervical adenopathy.        Right: No supraclavicular adenopathy present.        Left: No supraclavicular adenopathy present.   Neurological: She is alert and oriented to person, place, and time. She has normal strength and normal reflexes. No cranial nerve deficit or sensory deficit. Coordination normal.   Skin: Skin is warm and dry. No rash noted.   Psychiatric: She has a normal mood and affect. Her speech is normal and behavior is normal. Judgment and thought content normal. Cognition and memory are normal. She is attentive.   Nursing  note and vitals reviewed.      Assessment/Plan   Jocelyn was seen today for annual exam and hand pain.    Diagnoses and all orders for this visit:    PE (physical exam), annual  -     CBC Auto Differential  -     Comprehensive Metabolic Panel  -     Lipid Panel  -     TSH  -     Urinalysis With Microscopic If Indicated (No Culture) - Urine, Clean Catch    Screening for viral disease  -     Hepatitis C Antibody    Hyperprolactinemia (CMS/HCC)  -     Prolactin; Future    Left hand pain  -     Uric Acid    Obstructive sleep apnea syndrome    Risk assessment:  She does report an improvement in her stress level as she is working fewer hours and is in the process of changing jobs. Her palpitations and right shoulder pain have improved with the lower workload.  She has a hx (father) of CAD, evaluated by cardiology last year due to palpitations.  Her BMI is 33.7, reports not watching diet as closely over past couple of months with recent weight gain.  She is followed by GYN for hyperprolactinemia.    Prevention:  She receives her annual flu vaccine. Tdap is current.   Her pap smear and mammogram area scheduled with GYN for October 2020.  Her next colonoscopy is due in 2021 (personal hx of polyps).

## 2020-05-12 NOTE — PATIENT INSTRUCTIONS

## 2020-05-13 LAB
HCV AB S/CO SERPL IA: <0.1 S/CO RATIO (ref 0–0.9)
URATE SERPL-MCNC: 5.4 MG/DL (ref 2.4–5.7)

## 2020-05-15 LAB
ALBUMIN SERPL-MCNC: 4.7 G/DL (ref 3.5–5.2)
ALBUMIN/GLOB SERPL: 2.5 G/DL
ALP SERPL-CCNC: 53 U/L (ref 39–117)
ALT SERPL-CCNC: 22 U/L (ref 1–33)
AST SERPL-CCNC: 19 U/L (ref 1–32)
BILIRUB SERPL-MCNC: 0.6 MG/DL (ref 0.2–1.2)
BUN SERPL-MCNC: 12 MG/DL (ref 6–20)
BUN/CREAT SERPL: 14 (ref 7–25)
CALCIUM SERPL-MCNC: 9.4 MG/DL (ref 8.6–10.5)
CHLORIDE SERPL-SCNC: 105 MMOL/L (ref 98–107)
CHOLEST SERPL-MCNC: 198 MG/DL (ref 0–200)
CO2 SERPL-SCNC: 20.2 MMOL/L (ref 22–29)
CREAT SERPL-MCNC: 0.86 MG/DL (ref 0.57–1)
GLOBULIN SER CALC-MCNC: 1.9 GM/DL
GLUCOSE SERPL-MCNC: 95 MG/DL (ref 65–99)
HDLC SERPL-MCNC: 57 MG/DL (ref 40–60)
LDLC SERPL CALC-MCNC: 118 MG/DL (ref 0–100)
POTASSIUM SERPL-SCNC: 4.2 MMOL/L (ref 3.5–5.2)
PROLACTIN SERPL-MCNC: 7.8 NG/ML (ref 4.8–23.3)
PROT SERPL-MCNC: 6.6 G/DL (ref 6–8.5)
REF LAB TEST METHOD: NORMAL
SODIUM SERPL-SCNC: 140 MMOL/L (ref 136–145)
TRIGL SERPL-MCNC: 115 MG/DL (ref 0–150)
TSH SERPL DL<=0.005 MIU/L-ACNC: 2.12 UIU/ML (ref 0.27–4.2)
VLDLC SERPL CALC-MCNC: 23 MG/DL (ref 5–40)
WRITTEN AUTHORIZATION: NORMAL

## 2020-06-16 ENCOUNTER — APPOINTMENT (OUTPATIENT)
Dept: SLEEP MEDICINE | Facility: HOSPITAL | Age: 45
End: 2020-06-16

## 2020-07-01 ENCOUNTER — OFFICE VISIT (OUTPATIENT)
Dept: SLEEP MEDICINE | Facility: HOSPITAL | Age: 45
End: 2020-07-01

## 2020-07-01 VITALS
SYSTOLIC BLOOD PRESSURE: 118 MMHG | OXYGEN SATURATION: 97 % | WEIGHT: 220 LBS | HEART RATE: 78 BPM | HEIGHT: 67 IN | DIASTOLIC BLOOD PRESSURE: 77 MMHG | BODY MASS INDEX: 34.53 KG/M2

## 2020-07-01 DIAGNOSIS — G47.33 OBSTRUCTIVE SLEEP APNEA, ADULT: Primary | ICD-10-CM

## 2020-07-01 PROCEDURE — 99213 OFFICE O/P EST LOW 20 MIN: CPT | Performed by: FAMILY MEDICINE

## 2020-07-01 PROCEDURE — G0463 HOSPITAL OUTPT CLINIC VISIT: HCPCS

## 2020-07-01 NOTE — PROGRESS NOTES
Follow Up [facility] Disorders Center Note     Chief Complaint:  PRANAV     Primary Care Physician: Savita Estrella APRN Kelly A Amy is a 44 y.o.female  was last seen at [facility] sleep lab: January 3, 2020 for home sleep study where patient was found to have obstructive sleep apnea with overall AHI of 6.9 events per hour.  Started on auto CPAP 6-15 cm H2O.  Presents today for first follow-up visit.    Results Review:  DME is [facility].  Downloads between 4/1/2020-6/29/2020.  Average usage is 6 hours 14 minutes.  Average AHI is 0.3.  Average AutoCPAP pressure is 7.9 cm H2O.    Current Medications:    Current Outpatient Medications:   •  cabergoline (DOSTINEX) 0.5 MG tablet, Take 0.25 mg by mouth 2 (Two) Times a Week., Disp: , Rfl:   •  fexofenadine (ALLEGRA) 180 MG tablet, Take 180 mg by mouth as needed., Disp: , Rfl:   •  IBUPROFEN PO, Take  by mouth., Disp: , Rfl:   •  montelukast (SINGULAIR) 10 MG tablet, Take 1 tablet by mouth Every Night., Disp: 30 tablet, Rfl: 3  •  omeprazole OTC (PriLOSEC OTC) 20 MG EC tablet, Take 1 tablet by mouth Daily., Disp: 30 tablet, Rfl: 3   also entered in Sleep Questionnaire    Patient  has a past medical history of DVT (deep venous thrombosis) (CMS/[facility]) and Factor 5 Leiden mutation, heterozygous (CMS/[facility]).    Social History:    Social History     Socioeconomic History   • Marital status:      Spouse name: Not on file   • Number of children: Not on file   • Years of education: Not on file   • Highest education level: Not on file   Tobacco Use   • Smoking status: Never Smoker   • Smokeless tobacco: Never Used   • Tobacco comment: caffeine use    Substance and Sexual Activity   • Alcohol use: Yes     Comment: occasionally   • Drug use: No       Allergies:  Patient has no known allergies.    Review of Systems:    A complete review of systems was done and all were negative with the exception of postnasal drip    Vital Signs:    Vitals:    07/01/20 1414   BP: 118/77   Pulse: 78   SpO2: 97%  "  Weight: 99.8 kg (220 lb)   Height: 170.2 cm (67\")     Body mass index is 34.46 kg/m².    Vital Signs /77   Pulse 78   Ht 170.2 cm (67\")   Wt 99.8 kg (220 lb)   SpO2 97%   BMI 34.46 kg/m²  Body mass index is 34.46 kg/m².    General Alert and oriented. No acute distress noted   Pharynx/Throat Class II/III Mallampati airway, large tongue, no evidence of redundant lateral pharyngeal tissue. No oral lesions. No thrush. Moist mucous membranes.   Head Normocephalic. Symmetrical. Atraumatic.    Nose No septal deviation. No drainage   Chest Wall Normal shape. Symmetric expansion with respiration. No tenderness.   Neck Trachea midline, no thyromegaly or adenopathy    Lungs Clear to auscultation bilaterally. No wheezes. No rhonchi. No rales. Respirations regular, even and unlabored.   Heart Regular rhythm and normal rate. Normal S1 and S2. No murmur   Abdomen Soft, non-tender and non-distended. Normal bowel sounds. No masses.   Extremities Moves all extremities well. No edema   Psychiatric Normal mood and affect.     Impression:  1. Obstructive sleep apnea, adult        Obstructive sleep apnea adequately treated with auto CPAP 6-15 cm H2O with good compliance and usage and no complaints of hypersomnolence.  Advised to consider DreamWear nasal mask which is under the nose.  Advised to work on humidifier settings.  Return to clinic in 6 months for follow-up or sooner if needed.    Patient uses the CPAP device and benefits from its use in terms of reduction of hypersomnia and snoring.Weight loss will be strongly beneficial to reduce the severity of sleep-disordered breathing.  Caution during activities that require prolonged concentration is strongly advised if sleepiness returns. Changing of PAP supplies regularly is important for effective use. Patient needs to change cushion on the mask or plugs on nasal pillows along with disposable filters once every month and change mask frame, tubing, headgear and Velcro " straps every 6 months at the minimum.    Farhan Watters MD  Sleep Medicine  07/01/20  14:39

## 2020-08-19 DIAGNOSIS — R05.3 PERSISTENT COUGH: ICD-10-CM

## 2020-08-19 RX ORDER — MONTELUKAST SODIUM 10 MG/1
10 TABLET ORAL NIGHTLY
Qty: 90 TABLET | Refills: 3 | Status: SHIPPED | OUTPATIENT
Start: 2020-08-19 | End: 2021-10-18 | Stop reason: SDUPTHER

## 2020-10-06 ENCOUNTER — APPOINTMENT (OUTPATIENT)
Dept: WOMENS IMAGING | Facility: HOSPITAL | Age: 45
End: 2020-10-06

## 2020-10-06 PROCEDURE — 77067 SCR MAMMO BI INCL CAD: CPT | Performed by: RADIOLOGY

## 2021-01-06 ENCOUNTER — APPOINTMENT (OUTPATIENT)
Dept: SLEEP MEDICINE | Facility: HOSPITAL | Age: 46
End: 2021-01-06

## 2021-04-06 ENCOUNTER — BULK ORDERING (OUTPATIENT)
Dept: CASE MANAGEMENT | Facility: OTHER | Age: 46
End: 2021-04-06

## 2021-04-06 DIAGNOSIS — Z23 IMMUNIZATION DUE: ICD-10-CM

## 2021-06-15 ENCOUNTER — OFFICE VISIT (OUTPATIENT)
Dept: GASTROENTEROLOGY | Facility: CLINIC | Age: 46
End: 2021-06-15

## 2021-06-15 VITALS
OXYGEN SATURATION: 98 % | HEIGHT: 67 IN | DIASTOLIC BLOOD PRESSURE: 70 MMHG | WEIGHT: 233 LBS | HEART RATE: 74 BPM | BODY MASS INDEX: 36.57 KG/M2 | SYSTOLIC BLOOD PRESSURE: 116 MMHG

## 2021-06-15 DIAGNOSIS — R12 HEARTBURN: ICD-10-CM

## 2021-06-15 DIAGNOSIS — D12.2 ADENOMATOUS POLYP OF ASCENDING COLON: Primary | ICD-10-CM

## 2021-06-15 PROCEDURE — 99203 OFFICE O/P NEW LOW 30 MIN: CPT | Performed by: INTERNAL MEDICINE

## 2021-06-30 ENCOUNTER — TELEPHONE (OUTPATIENT)
Dept: GASTROENTEROLOGY | Facility: CLINIC | Age: 46
End: 2021-06-30

## 2021-06-30 NOTE — TELEPHONE ENCOUNTER
----- Message from Lillian Herrera sent at 6/28/2021 10:46 AM EDT -----  Contact: [phone]  Patient needs to reschedule scope.  Please call.

## 2021-06-30 NOTE — TELEPHONE ENCOUNTER
SPOKE WITH PT RSW FROM 7-14 TO 8-11 ARRIVE AT 1030 AM [facility] CLOSED CALL IN THE AM TO PUT ON SCHEDULE

## 2021-08-11 ENCOUNTER — OUTSIDE FACILITY SERVICE (OUTPATIENT)
Dept: GASTROENTEROLOGY | Facility: CLINIC | Age: 46
End: 2021-08-11

## 2021-08-11 PROCEDURE — 45378 DIAGNOSTIC COLONOSCOPY: CPT | Performed by: INTERNAL MEDICINE

## 2021-08-11 PROCEDURE — 43239 EGD BIOPSY SINGLE/MULTIPLE: CPT | Performed by: INTERNAL MEDICINE

## 2021-08-11 RX ORDER — PANTOPRAZOLE SODIUM 40 MG/1
40 TABLET, DELAYED RELEASE ORAL 2 TIMES DAILY
Qty: 60 TABLET | Refills: 12 | Status: SHIPPED | OUTPATIENT
Start: 2021-08-11 | End: 2021-10-18 | Stop reason: SDUPTHER

## 2021-09-28 ENCOUNTER — OFFICE VISIT (OUTPATIENT)
Dept: GASTROENTEROLOGY | Facility: CLINIC | Age: 46
End: 2021-09-28

## 2021-09-28 VITALS — HEIGHT: 67 IN | WEIGHT: 231.6 LBS | TEMPERATURE: 97.2 F | BODY MASS INDEX: 36.35 KG/M2

## 2021-09-28 DIAGNOSIS — R10.13 DYSPEPSIA: ICD-10-CM

## 2021-09-28 DIAGNOSIS — R68.81 EARLY SATIETY: Primary | ICD-10-CM

## 2021-09-28 PROCEDURE — 99214 OFFICE O/P EST MOD 30 MIN: CPT | Performed by: NURSE PRACTITIONER

## 2021-09-28 NOTE — PROGRESS NOTES
Chief Complaint   Patient presents with   • Gastric ulcer       HPI    Jocelyn Reyes is a  45 y.o. female here for a follow up visit for gastric ulcer status post endoscopic evaluation.    This patient follows with Dr. Yousif, new to me.    Reviewed procedures dated 8/11/2021:    EGD w/ nonbleeding gastric ulcer.  PPI therapy p.o. twice daily.  Cscope w/ nonbleeding internal hemorrhoids otherwise normal.  Recall 5 years.  Pathology was benign.    On visit today patient reports improvement in symptoms on twice daily dosing PPI therapy.  She still having occasional dyspepsia, some nausea and early satiety however.  She is compliant with Protonix 40 mg p.o. twice daily to treat gastric ulcer.  She is avoiding NSAIDs.  Denies vomiting, dysphagia, odynophagia.    No lower GI complaints.    Past Medical History:   Diagnosis Date   • DVT (deep venous thrombosis) (CMS/HCC)     2010   • Factor 5 Leiden mutation, heterozygous (CMS/HCC)        Past Surgical History:   Procedure Laterality Date   • BREAST SURGERY Bilateral 2009    breast reduction   • COLONOSCOPY N/A 7/25/2016    Procedure: COLONOSCOPY to cecum / TI;  Surgeon: Ulisses Yousif MD;  Location: [facility] ENDOSCOPY;  Service:    • COLONOSCOPY W/ POLYPECTOMY  2011   • ENDOMETRIAL ABLATION  2011   • ENDOSCOPY N/A 7/25/2016    Procedure: ESOPHAGOGASTRODUODENOSCOPY;  Surgeon: Ulisses Yousif MD;  Location: [facility] ENDOSCOPY;  Service:    • LAPAROSCOPIC TUBAL LIGATION  2011   • TONSILLECTOMY     • TUBAL ABDOMINAL LIGATION  2011       Scheduled Meds:     Continuous Infusions: No current facility-administered medications for this visit.      PRN Meds:     No Known Allergies    Social History     Socioeconomic History   • Marital status:      Spouse name: Not on file   • Number of children: Not on file   • Years of education: Not on file   • Highest education level: Not on file   Tobacco Use   • Smoking status: Never Smoker   • Smokeless tobacco: Never Used   • Tobacco  comment: caffeine use    Substance and Sexual Activity   • Alcohol use: Yes     Comment: occasionally   • Drug use: No       Family History   Problem Relation Age of Onset   • Breast cancer Maternal Aunt    • Uterine cancer Maternal Aunt    • Pancreatic cancer Maternal Uncle    • Colon cancer Paternal Uncle    • Thyroid cancer Maternal Grandmother    • Lung cancer Maternal Grandfather    • Coronary artery disease Mother    • Hypertension Mother    • Ulcers Father         bleeding   • Heart attack Paternal Grandmother 15       Review of Systems   Constitutional: Negative for activity change, appetite change, fatigue, fever and unexpected weight change.   HENT: Negative for trouble swallowing.    Respiratory: Negative for apnea, cough, choking, chest tightness, shortness of breath and wheezing.    Cardiovascular: Negative for chest pain, palpitations and leg swelling.   Gastrointestinal: Negative for abdominal distention, abdominal pain, anal bleeding, blood in stool, constipation, diarrhea, nausea, rectal pain and vomiting.       Vitals:    09/28/21 0756   Temp: 97.2 °F (36.2 °C)       Physical Exam  Constitutional:       Appearance: She is well-developed.   Abdominal:      General: Bowel sounds are normal. There is no distension.      Palpations: Abdomen is soft. There is no mass.      Tenderness: There is no abdominal tenderness. There is no guarding.      Hernia: No hernia is present.   Skin:     General: Skin is warm and dry.      Capillary Refill: Capillary refill takes less than 2 seconds.   Neurological:      Mental Status: She is alert and oriented to person, place, and time.   Psychiatric:         Behavior: Behavior normal.     Assessment    Diagnoses and all orders for this visit:    1. Early satiety (Primary)  -     NM Gastric Emptying; Future    2. Dyspepsia  -     NM Gastric Emptying; Future    Plan    Very pleasant 45-year-old female seen today in follow-up.  Reviewed recent endoscopic evaluation as  well as pathology and all questions were answered.  Patient reporting improvement in prior symptoms with twice daily dosing PPI therapy.  Still with some nausea and improving early satiety.  We discussed moving forward with gastric emptying study if symptoms do not completely resolve over the next couple of weeks.  Encourage antireflux diet and avoidance of NSAIDs.  Return to clinic 8 weeks.           SARAH BETH Sage  [facility]  [address] [place] [address]  Office: [phone]

## 2021-10-18 DIAGNOSIS — R05.3 PERSISTENT COUGH: ICD-10-CM

## 2021-10-18 RX ORDER — MONTELUKAST SODIUM 10 MG/1
10 TABLET ORAL NIGHTLY
Qty: 90 TABLET | Refills: 3 | Status: SHIPPED | OUTPATIENT
Start: 2021-10-18

## 2021-10-19 RX ORDER — PANTOPRAZOLE SODIUM 40 MG/1
40 TABLET, DELAYED RELEASE ORAL 2 TIMES DAILY
Qty: 60 TABLET | Refills: 12 | Status: SHIPPED | OUTPATIENT
Start: 2021-10-19

## 2021-10-21 ENCOUNTER — OFFICE VISIT (OUTPATIENT)
Dept: INTERNAL MEDICINE | Facility: CLINIC | Age: 46
End: 2021-10-21

## 2021-10-21 VITALS
RESPIRATION RATE: 16 BRPM | HEART RATE: 69 BPM | TEMPERATURE: 97.8 F | BODY MASS INDEX: 36.54 KG/M2 | SYSTOLIC BLOOD PRESSURE: 130 MMHG | WEIGHT: 232.8 LBS | HEIGHT: 67 IN | DIASTOLIC BLOOD PRESSURE: 72 MMHG | OXYGEN SATURATION: 96 %

## 2021-10-21 DIAGNOSIS — K25.3 ACUTE GASTRIC ULCER WITHOUT HEMORRHAGE OR PERFORATION: ICD-10-CM

## 2021-10-21 DIAGNOSIS — E22.1 HYPERPROLACTINEMIA (HCC): ICD-10-CM

## 2021-10-21 DIAGNOSIS — J30.89 NON-SEASONAL ALLERGIC RHINITIS, UNSPECIFIED TRIGGER: ICD-10-CM

## 2021-10-21 DIAGNOSIS — Z00.00 PE (PHYSICAL EXAM), ANNUAL: Primary | ICD-10-CM

## 2021-10-21 DIAGNOSIS — D68.51 FACTOR V LEIDEN (HCC): Chronic | ICD-10-CM

## 2021-10-21 DIAGNOSIS — G47.33 OBSTRUCTIVE SLEEP APNEA, ADULT: Chronic | ICD-10-CM

## 2021-10-21 DIAGNOSIS — Z12.31 ENCOUNTER FOR SCREENING MAMMOGRAM FOR MALIGNANT NEOPLASM OF BREAST: ICD-10-CM

## 2021-10-21 PROCEDURE — 99396 PREV VISIT EST AGE 40-64: CPT | Performed by: NURSE PRACTITIONER

## 2021-10-21 RX ORDER — CETIRIZINE HYDROCHLORIDE 10 MG/1
10 TABLET ORAL DAILY
Qty: 30 TABLET | Refills: 0
Start: 2021-10-21

## 2021-10-21 NOTE — PROGRESS NOTES
"Chief Complaint  Annual Exam    Subjective     {Problem List  Visit Diagnosis   Encounters  Notes  Medications  Labs  Result Review Imaging  Media :23}     Jocelyn Reyes presents to [facility]  History of Present Illness    Objective dsdf  Vital Signs:   Resp 16   Ht 170.2 cm (67\")   Wt 106 kg (232 lb 12.8 oz)   BMI 36.46 kg/m²     Physical Exam   Result Review :{Labs  Result Review  Imaging  Med Tab  Media  Procedures :23}   {The following data was reviewed by (Optional):57642}  {Ambulatory Labs (Optional):74829}  {Data reviewed (Optional):08556:::1}          Assessment and Plan {CC Problem List  Visit Diagnosis   ROS  Review (Popup)  Health Maintenance  Quality  BestPractice  Medications  SmartSets  SnapShot Encounters  Media :23}   There are no diagnoses linked to this encounter.  {Time Spent (Optional):54913}  Follow Up {Instructions Charge Capture  Follow-up Communications :23}  No follow-ups on file.  Patient was given instructions and counseling regarding her condition or for health maintenance advice. Please see specific information pulled into the AVS if appropriate.       "

## 2021-10-22 LAB
ALBUMIN SERPL-MCNC: 4.7 G/DL (ref 3.8–4.8)
ALBUMIN/GLOB SERPL: 1.9 {RATIO} (ref 1.2–2.2)
ALP SERPL-CCNC: 74 IU/L (ref 44–121)
ALT SERPL-CCNC: 20 IU/L (ref 0–32)
APPEARANCE UR: CLEAR
AST SERPL-CCNC: 17 IU/L (ref 0–40)
BILIRUB SERPL-MCNC: 0.5 MG/DL (ref 0–1.2)
BILIRUB UR QL STRIP: NEGATIVE
BUN SERPL-MCNC: 13 MG/DL (ref 6–24)
BUN/CREAT SERPL: 17 (ref 9–23)
CALCIUM SERPL-MCNC: 10.1 MG/DL (ref 8.7–10.2)
CHLORIDE SERPL-SCNC: 104 MMOL/L (ref 96–106)
CHOLEST SERPL-MCNC: 189 MG/DL (ref 100–199)
CO2 SERPL-SCNC: 24 MMOL/L (ref 20–29)
COLOR UR: YELLOW
CREAT SERPL-MCNC: 0.78 MG/DL (ref 0.57–1)
ERYTHROCYTE [DISTWIDTH] IN BLOOD BY AUTOMATED COUNT: 12.9 % (ref 11.7–15.4)
GLOBULIN SER CALC-MCNC: 2.5 G/DL (ref 1.5–4.5)
GLUCOSE SERPL-MCNC: 103 MG/DL (ref 65–99)
GLUCOSE UR QL: NEGATIVE
HCT VFR BLD AUTO: 44.3 % (ref 34–46.6)
HDLC SERPL-MCNC: 49 MG/DL
HGB BLD-MCNC: 14.8 G/DL (ref 11.1–15.9)
HGB UR QL STRIP: NEGATIVE
KETONES UR QL STRIP: NEGATIVE
LDLC SERPL CALC-MCNC: 123 MG/DL (ref 0–99)
LEUKOCYTE ESTERASE UR QL STRIP: NEGATIVE
MCH RBC QN AUTO: 29.7 PG (ref 26.6–33)
MCHC RBC AUTO-ENTMCNC: 33.4 G/DL (ref 31.5–35.7)
MCV RBC AUTO: 89 FL (ref 79–97)
MICRO URNS: NORMAL
NITRITE UR QL STRIP: NEGATIVE
PH UR STRIP: 5.5 [PH] (ref 5–7.5)
PLATELET # BLD AUTO: 308 X10E3/UL (ref 150–450)
POTASSIUM SERPL-SCNC: 4.9 MMOL/L (ref 3.5–5.2)
PROT SERPL-MCNC: 7.2 G/DL (ref 6–8.5)
PROT UR QL STRIP: NEGATIVE
RBC # BLD AUTO: 4.98 X10E6/UL (ref 3.77–5.28)
SODIUM SERPL-SCNC: 142 MMOL/L (ref 134–144)
SP GR UR: 1.02 (ref 1–1.03)
TRIGL SERPL-MCNC: 93 MG/DL (ref 0–149)
TSH SERPL DL<=0.005 MIU/L-ACNC: 1.48 UIU/ML (ref 0.45–4.5)
UROBILINOGEN UR STRIP-MCNC: 0.2 MG/DL (ref 0.2–1)
VLDLC SERPL CALC-MCNC: 17 MG/DL (ref 5–40)
WBC # BLD AUTO: 7.5 X10E3/UL (ref 3.4–10.8)

## 2021-10-24 PROBLEM — D68.51 FACTOR V LEIDEN (HCC): Chronic | Status: ACTIVE | Noted: 2019-10-25

## 2021-10-24 PROBLEM — K25.3 ACUTE GASTRIC ULCER WITHOUT HEMORRHAGE OR PERFORATION: Chronic | Status: ACTIVE | Noted: 2021-10-24

## 2021-10-24 PROBLEM — J30.89 NON-SEASONAL ALLERGIC RHINITIS: Chronic | Status: ACTIVE | Noted: 2021-10-24

## 2021-10-24 PROBLEM — G47.33 OBSTRUCTIVE SLEEP APNEA, ADULT: Chronic | Status: ACTIVE | Noted: 2020-07-01

## 2021-10-24 PROBLEM — K25.3 ACUTE GASTRIC ULCER WITHOUT HEMORRHAGE OR PERFORATION: Status: ACTIVE | Noted: 2021-10-24

## 2021-10-24 PROBLEM — E22.1 HYPERPROLACTINEMIA (HCC): Chronic | Status: ACTIVE | Noted: 2020-05-12

## 2021-10-24 PROBLEM — J30.89 NON-SEASONAL ALLERGIC RHINITIS: Status: ACTIVE | Noted: 2021-10-24

## 2021-10-24 NOTE — PROGRESS NOTES
Subjective   Jocelyn Reyes is a 45 y.o. female who presents for a physical exam.    She c/o a daily headache for the past 6-10 months which she wakes up and for which she has taken Ibuprofen. She c/o nausea with abdominal fullness, EGD showed nonbleeding gastric ulcer. She has since discontinued Ibuprofen but continues to c/o intermittent headaches.       The following portions of the patient's history were reviewed and updated as appropriate: allergies, current medications, past social history and problem list.    Past Medical History:   Diagnosis Date   • DVT (deep venous thrombosis) ([facility])     2010   • Factor 5 Leiden mutation, heterozygous ([facility])          Current Outpatient Medications:   •  cabergoline (DOSTINEX) 0.5 MG tablet, Take 0.5 tablets by mouth 2 (Two) Times a Week., Disp: 12 tablet, Rfl: 1  •  montelukast (SINGULAIR) 10 MG tablet, Take 1 tablet by mouth Every Night., Disp: 90 tablet, Rfl: 3  •  pantoprazole (PROTONIX) 40 MG EC tablet, Take 1 tablet by mouth 2 (Two) Times a Day., Disp: 60 tablet, Rfl: 12  •  cetirizine (zyrTEC) 10 MG tablet, Take 1 tablet by mouth Daily., Disp: 30 tablet, Rfl: 0    No Known Allergies    Review of Systems   Constitutional: Negative for activity change, appetite change, chills, diaphoresis, fatigue, fever and unexpected weight change.   HENT: Positive for congestion, hearing loss, postnasal drip, rhinorrhea and tinnitus. Negative for dental problem, drooling, ear discharge, ear pain, facial swelling, mouth sores, nosebleeds, sinus pressure, sore throat and trouble swallowing.    Eyes: Positive for visual disturbance (wears glasses, no vision changes). Negative for photophobia, pain, discharge, redness and itching.   Respiratory: Positive for shortness of breath (with heavy exertion). Negative for apnea, cough, choking, chest tightness and wheezing.    Cardiovascular: Positive for palpitations (intermittent, sx stable). Negative for chest pain and leg swelling.        No  "orthopnea, PND, LARIOS   Gastrointestinal: Positive for abdominal pain and vomiting. Negative for blood in stool, constipation, diarrhea and nausea.   Endocrine: Positive for cold intolerance. Negative for heat intolerance, polydipsia and polyuria.   Genitourinary: Negative for decreased urine volume, dysuria, enuresis, flank pain, frequency, hematuria and urgency.   Musculoskeletal: Positive for arthralgias, back pain, neck pain and neck stiffness. Negative for gait problem, joint swelling and myalgias.   Skin: Negative for color change and rash.        No hair changes, no nail changes   Allergic/Immunologic: Negative for environmental allergies, food allergies and immunocompromised state.   Neurological: Positive for dizziness, light-headedness and headaches. Negative for tremors, seizures, syncope, speech difficulty, weakness and numbness.   Hematological: Negative for adenopathy. Does not bruise/bleed easily.   Psychiatric/Behavioral: Negative for agitation, confusion, decreased concentration, dysphoric mood, sleep disturbance and suicidal ideas. The patient is not nervous/anxious.        Objective   Vitals:    10/21/21 0841   BP: 130/72   BP Location: Left arm   Patient Position: Sitting   Cuff Size: Adult   Pulse: 69   Resp: 16   Temp: 97.8 °F (36.6 °C)   TempSrc: Temporal   SpO2: 96%   Weight: 106 kg (232 lb 12.8 oz)   Height: 170.2 cm (67\")     Body mass index is 36.46 kg/m².  Physical Exam  Constitutional:       General: She is not in acute distress.     Appearance: Normal appearance. She is not diaphoretic.   HENT:      Head: Normocephalic and atraumatic.      Right Ear: Tympanic membrane, ear canal and external ear normal.      Left Ear: Tympanic membrane, ear canal and external ear normal.      Nose: Nose normal. No rhinorrhea.      Mouth/Throat:      Mouth: Mucous membranes are moist.      Pharynx: Oropharynx is clear.   Eyes:      General:         Right eye: No discharge.         Left eye: No discharge. "      Conjunctiva/sclera: Conjunctivae normal.   Cardiovascular:      Rate and Rhythm: Normal rate and regular rhythm.      Pulses: Normal pulses.      Heart sounds: Normal heart sounds.   Pulmonary:      Effort: Pulmonary effort is normal.      Breath sounds: Normal breath sounds.   Chest:   Breasts:      Right: Normal. No mass, nipple discharge, skin change or tenderness.      Left: Normal. No mass, nipple discharge, skin change or tenderness.       Abdominal:      General: Bowel sounds are normal.      Tenderness: There is no abdominal tenderness.   Musculoskeletal:         General: No swelling or tenderness.      Cervical back: Normal range of motion.   Skin:     General: Skin is warm and dry.   Neurological:      General: No focal deficit present.      Mental Status: She is alert and oriented to person, place, and time.   Psychiatric:         Mood and Affect: Mood normal.         Behavior: Behavior normal.         Judgment: Judgment normal.         Assessment/Plan   Diagnoses and all orders for this visit:    1. PE (physical exam), annual (Primary)  -     CBC (No Diff)  -     Comprehensive Metabolic Panel  -     Lipid Panel  -     TSH  -     Urinalysis With Microscopic If Indicated (No Culture) - Urine, Clean Catch    2. Hyperprolactinemia (HCC)  Assessment & Plan:  Currently managed on Dostinex; MRI 4/2019, repeat for f/u     Orders:  -     MRI pituitary w wo contrast; Future    3. Non-seasonal allergic rhinitis, unspecified trigger  Assessment & Plan:  Sx stable with Zyrtec as needed    Orders:  -     cetirizine (zyrTEC) 10 MG tablet; Take 1 tablet by mouth Daily.  Dispense: 30 tablet; Refill: 0    4. Obstructive sleep apnea, adult    5. Factor V Leiden (HCC)  Assessment & Plan:  Hx DVT      6. Acute gastric ulcer without hemorrhage or perforation  Assessment & Plan:  EGD 8/11/2021, started on Protonix 40mg bid-notes improvement in sx. However, she continues to c/o early satiety and is scheduled for a gastric  emptying study        Risk assessment:  She has a family hx (mother) of CAD-check fasting labs today.  Her Body mass index is 36.46 kg/m². - She has lost weight in the past by following a low carb diet.    Prevention:  She has received her annual flu vaccine. Tdap is current.  Colonoscopy is due in 2026.   She is followed by gynecology for pelvic exams, will schedule mammogram.    Discussed healthy lifestyle choices such as maintaining a balanced diet low in carbohydrates and limiting caffeine and alcohol intake.  Recommended routine exercise for bone strength and cardiovascular health.

## 2021-10-24 NOTE — ASSESSMENT & PLAN NOTE
EGD 8/11/2021, started on Protonix 40mg bid-notes improvement in sx. However, she continues to c/o early satiety and is scheduled for a gastric emptying study

## 2021-11-19 ENCOUNTER — HOSPITAL ENCOUNTER (OUTPATIENT)
Dept: MRI IMAGING | Facility: HOSPITAL | Age: 46
Discharge: HOME | End: 2021-11-19
Admitting: NURSE PRACTITIONER

## 2021-11-19 DIAGNOSIS — E22.1 HYPERPROLACTINEMIA (HCC): ICD-10-CM

## 2021-11-19 PROCEDURE — 0 GADOBENATE DIMEGLUMINE 529 MG/ML SOLUTION: Performed by: NURSE PRACTITIONER

## 2021-11-19 PROCEDURE — A9577 INJ MULTIHANCE: HCPCS | Performed by: NURSE PRACTITIONER

## 2021-11-19 PROCEDURE — 70553 MRI BRAIN STEM W/O & W/DYE: CPT

## 2021-11-19 RX ADMIN — GADOBENATE DIMEGLUMINE 20 ML: 529 INJECTION, SOLUTION INTRAVENOUS at 08:40

## 2021-11-26 ENCOUNTER — HOSPITAL ENCOUNTER (OUTPATIENT)
Dept: NUCLEAR MEDICINE | Facility: HOSPITAL | Age: 46
Discharge: HOME | End: 2021-11-26

## 2021-11-26 DIAGNOSIS — R68.81 EARLY SATIETY: ICD-10-CM

## 2021-11-26 DIAGNOSIS — R10.13 DYSPEPSIA: ICD-10-CM

## 2021-11-26 PROCEDURE — 0 TECHNETIUM ALBUMIN AGGREGATED: Performed by: NURSE PRACTITIONER

## 2021-11-26 PROCEDURE — A9540 TC99M MAA: HCPCS | Performed by: NURSE PRACTITIONER

## 2021-11-26 PROCEDURE — 78264 GASTRIC EMPTYING IMG STUDY: CPT

## 2021-11-26 RX ADMIN — KIT FOR THE PREPARATION OF TECHNETIUM TC 99M ALBUMIN AGGREGATED 1 DOSE: 2.5 INJECTION, POWDER, FOR SOLUTION INTRAVENOUS at 07:09

## 2021-11-28 NOTE — PROGRESS NOTES
Please inform Jocelyn that her gastric emptying study showed delayed emptying throughout the exam.  Recommend gastroparesis diet, please mail a copy of the handout to her home.  Please arrange referral to see the [facility] nutritionist to discuss gastroparesis diet.  Arrange follow-up to discuss further.

## 2021-11-30 ENCOUNTER — TELEPHONE (OUTPATIENT)
Dept: GASTROENTEROLOGY | Facility: CLINIC | Age: 46
End: 2021-11-30

## 2021-11-30 DIAGNOSIS — K31.84 GASTROPARESIS: Primary | ICD-10-CM

## 2021-11-30 NOTE — TELEPHONE ENCOUNTER
Referral to nutrition services.  Gastroparesis diet mailed to patient's home.   Results sent via My Chart.

## 2021-11-30 NOTE — TELEPHONE ENCOUNTER
Please inform Jocelyn that her gastric emptying study showed delayed emptying throughout the exam.  Recommend gastroparesis diet, please mail a copy of the handout to her home.  Please arrange referral to see the [employer] nutritionist to discuss gastroparesis diet.  Arrange follow-up to discuss further.

## 2021-12-01 ENCOUNTER — OFFICE VISIT (OUTPATIENT)
Dept: GASTROENTEROLOGY | Facility: CLINIC | Age: 46
End: 2021-12-01

## 2021-12-01 DIAGNOSIS — K31.84 GASTROPARESIS: Primary | ICD-10-CM

## 2021-12-01 DIAGNOSIS — K21.9 GASTROESOPHAGEAL REFLUX DISEASE, UNSPECIFIED WHETHER ESOPHAGITIS PRESENT: ICD-10-CM

## 2021-12-01 PROCEDURE — 99213 OFFICE O/P EST LOW 20 MIN: CPT | Performed by: NURSE PRACTITIONER

## 2021-12-01 RX ORDER — ONDANSETRON 4 MG/1
4 TABLET, ORALLY DISINTEGRATING ORAL EVERY 8 HOURS PRN
Qty: 25 TABLET | Refills: 2 | Status: SHIPPED | OUTPATIENT
Start: 2021-12-01

## 2021-12-01 NOTE — PROGRESS NOTES
Chief Complaint   Patient presents with   • Gastroparesis       HPI    Jocelyn Reyes is a  46 y.o. female here for a follow up visit for nausea and early satiety.    This patient follows with Dr. Yousif myself.    Patient diagnosed with a nonbleeding gastric ulcer following EGD in August.  She had a colonoscopy as well which demonstrated nonbleeding internal hemorrhoids.  Recall 5 years.    Patient been maintained on twice daily dosing PPI therapy but still having issues therefore gastric emptying study was completed and consistent with gastroparesis.    Today she still struggle with nausea and early satiety but no vomiting or abdominal pain.  She is taking Protonix 40 mg p.o. twice daily.  She has now received a message from our office regarding her gastric emptying study.    Denies lower GI complaints.    Past Medical History:   Diagnosis Date   • Colon polyp 2010?   • DVT (deep venous thrombosis) (HCC)     2010   • Factor 5 Leiden mutation, heterozygous (HCC)    • GERD (gastroesophageal reflux disease) 2010   • Hyperlipidemia 2019       Past Surgical History:   Procedure Laterality Date   • BREAST SURGERY Bilateral 2009    breast reduction   • COLONOSCOPY N/A 7/25/2016    Procedure: COLONOSCOPY to cecum / TI;  Surgeon: Ulisses Yousif MD;  Location: [facility] ENDOSCOPY;  Service:    • COLONOSCOPY W/ POLYPECTOMY  2011   • ENDOMETRIAL ABLATION  2011   • ENDOSCOPY N/A 7/25/2016    Procedure: ESOPHAGOGASTRODUODENOSCOPY;  Surgeon: Ulisses Yousif MD;  Location: [facility] ENDOSCOPY;  Service:    • LAPAROSCOPIC TUBAL LIGATION  2011   • TONSILLECTOMY     • TUBAL ABDOMINAL LIGATION  2011   • UPPER GASTROINTESTINAL ENDOSCOPY  2021       Scheduled Meds:     Continuous Infusions: No current facility-administered medications for this visit.      PRN Meds:     No Known Allergies    Social History     Socioeconomic History   • Marital status:    Tobacco Use   • Smoking status: Never Smoker   • Smokeless tobacco: Never Used   •  Tobacco comment: caffeine use    Substance and Sexual Activity   • Alcohol use: Yes     Alcohol/week: 0.0 standard drinks     Comment: Rarely   • Drug use: No       Family History   Problem Relation Age of Onset   • Breast cancer Maternal Aunt    • Uterine cancer Maternal Aunt    • Pancreatic cancer Maternal Uncle    • Colon cancer Paternal Uncle    • Thyroid cancer Maternal Grandmother    • Lung cancer Maternal Grandfather    • Coronary artery disease Mother    • Hypertension Mother    • Ulcers Father         bleeding   • Heart attack Paternal Grandmother 15       Review of Systems   Constitutional: Negative for activity change, appetite change, fatigue, fever and unexpected weight change.   HENT: Negative for trouble swallowing.    Respiratory: Negative for apnea, cough, choking, chest tightness, shortness of breath and wheezing.    Cardiovascular: Negative for chest pain, palpitations and leg swelling.   Gastrointestinal: Positive for nausea. Negative for abdominal distention, abdominal pain, anal bleeding, blood in stool, constipation, diarrhea, rectal pain and vomiting.       There were no vitals filed for this visit.    Physical Exam  Constitutional:       Appearance: She is well-developed.   Abdominal:      General: Bowel sounds are normal. There is no distension.      Palpations: Abdomen is soft. There is no mass.      Tenderness: There is no abdominal tenderness. There is no guarding.      Hernia: No hernia is present.   Skin:     General: Skin is warm and dry.      Capillary Refill: Capillary refill takes less than 2 seconds.   Neurological:      Mental Status: She is alert and oriented to person, place, and time.   Psychiatric:         Behavior: Behavior normal.     Assessment    Diagnoses and all orders for this visit:    1. Gastroparesis (Primary)    2. Gastroesophageal reflux disease, unspecified whether esophagitis present    Other orders  -     ondansetron ODT (Zofran ODT) 4 MG disintegrating tablet;  Place 1 tablet on the tongue Every 8 (Eight) Hours As Needed for Nausea or Vomiting.  Dispense: 25 tablet; Refill: 2    Plan    Strict gastroparesis diet.  Educational handout regarding gastroparesis diet provided to the patient.  Referral to nutritionist has been sent.  Trial of Zofran for relief of nausea in the interim.  Continue twice daily dosing PPI therapy.  Follow-up 3 weeks.  If she does not improve with dietary changes consider azithromycin.           SARAH BETH Sage  [facility]  [address] [place] [address]  Office: [phone]    .diag

## 2021-12-30 ENCOUNTER — HOSPITAL ENCOUNTER (OUTPATIENT)
Dept: DIABETES SERVICES | Facility: HOSPITAL | Age: 46
Discharge: HOME | End: 2021-12-30
Admitting: NURSE PRACTITIONER

## 2021-12-30 PROCEDURE — 97802 MEDICAL NUTRITION INDIV IN: CPT

## 2025-06-05 ENCOUNTER — APPOINTMENT (OUTPATIENT)
Dept: WOMENS IMAGING | Facility: HOSPITAL | Age: 50
End: 2025-06-05
Payer: COMMERCIAL

## 2025-06-05 PROCEDURE — 76642 ULTRASOUND BREAST LIMITED: CPT | Performed by: RADIOLOGY

## 2025-06-05 PROCEDURE — 77061 BREAST TOMOSYNTHESIS UNI: CPT | Performed by: RADIOLOGY

## 2025-06-05 PROCEDURE — 77065 DX MAMMO INCL CAD UNI: CPT | Performed by: RADIOLOGY

## 2025-06-05 PROCEDURE — G0279 TOMOSYNTHESIS, MAMMO: HCPCS | Performed by: RADIOLOGY

## 2025-06-18 ENCOUNTER — LAB REQUISITION (OUTPATIENT)
Dept: LAB | Facility: HOSPITAL | Age: 50
End: 2025-06-18
Payer: COMMERCIAL

## 2025-06-18 ENCOUNTER — APPOINTMENT (OUTPATIENT)
Dept: WOMENS IMAGING | Facility: HOSPITAL | Age: 50
End: 2025-06-18
Payer: COMMERCIAL

## 2025-06-18 DIAGNOSIS — N63.0 UNSPECIFIED LUMP IN UNSPECIFIED BREAST: ICD-10-CM

## 2025-06-18 PROCEDURE — 88305 TISSUE EXAM BY PATHOLOGIST: CPT | Performed by: OBSTETRICS & GYNECOLOGY

## 2025-06-18 PROCEDURE — A4648 IMPLANTABLE TISSUE MARKER: HCPCS | Performed by: RADIOLOGY

## 2025-06-18 PROCEDURE — 19083 BX BREAST 1ST LESION US IMAG: CPT | Performed by: RADIOLOGY

## 2025-06-20 LAB
CYTO UR: NORMAL
DX PRELIMINARY: NORMAL
LAB AP CASE REPORT: NORMAL
LAB AP CLINICAL INFORMATION: NORMAL
PATH REPORT.FINAL DX SPEC: NORMAL
PATH REPORT.GROSS SPEC: NORMAL